# Patient Record
Sex: FEMALE | Race: WHITE | NOT HISPANIC OR LATINO | Employment: FULL TIME | URBAN - METROPOLITAN AREA
[De-identification: names, ages, dates, MRNs, and addresses within clinical notes are randomized per-mention and may not be internally consistent; named-entity substitution may affect disease eponyms.]

---

## 2017-03-08 DIAGNOSIS — F41.1 GENERALIZED ANXIETY DISORDER: ICD-10-CM

## 2017-03-08 RX ORDER — SERTRALINE HYDROCHLORIDE 100 MG/1
100 TABLET, FILM COATED ORAL DAILY
Qty: 30 TABLET | Refills: 0 | Status: SHIPPED | OUTPATIENT
Start: 2017-03-08 | End: 2017-04-07 | Stop reason: SDUPTHER

## 2017-04-07 DIAGNOSIS — F41.1 GENERALIZED ANXIETY DISORDER: ICD-10-CM

## 2017-04-10 DIAGNOSIS — F41.1 GENERALIZED ANXIETY DISORDER: ICD-10-CM

## 2017-04-10 RX ORDER — SERTRALINE HYDROCHLORIDE 100 MG/1
TABLET, FILM COATED ORAL
Qty: 90 TABLET | Refills: 0 | OUTPATIENT
Start: 2017-04-10

## 2017-04-10 RX ORDER — SERTRALINE HYDROCHLORIDE 100 MG/1
100 TABLET, FILM COATED ORAL DAILY
Qty: 15 TABLET | Refills: 0 | Status: SHIPPED | OUTPATIENT
Start: 2017-04-10 | End: 2017-05-26 | Stop reason: SDUPTHER

## 2017-05-26 ENCOUNTER — TELEPHONE (OUTPATIENT)
Dept: INTERNAL MEDICINE | Age: 22
End: 2017-05-26

## 2017-05-26 DIAGNOSIS — F41.1 GENERALIZED ANXIETY DISORDER: ICD-10-CM

## 2017-05-26 RX ORDER — SERTRALINE HYDROCHLORIDE 100 MG/1
100 TABLET, FILM COATED ORAL DAILY
Qty: 30 TABLET | Refills: 0 | Status: SHIPPED | OUTPATIENT
Start: 2017-05-26 | End: 2017-06-25 | Stop reason: SDUPTHER

## 2017-05-26 RX ORDER — SERTRALINE HYDROCHLORIDE 100 MG/1
TABLET, FILM COATED ORAL
Qty: 90 TABLET | Refills: 0 | OUTPATIENT
Start: 2017-05-26

## 2017-06-16 ENCOUNTER — OFFICE VISIT (OUTPATIENT)
Dept: INTERNAL MEDICINE | Age: 22
End: 2017-06-16

## 2017-06-16 VITALS
SYSTOLIC BLOOD PRESSURE: 118 MMHG | BODY MASS INDEX: 22.18 KG/M2 | HEART RATE: 89 BPM | HEIGHT: 66 IN | WEIGHT: 138 LBS | DIASTOLIC BLOOD PRESSURE: 80 MMHG | TEMPERATURE: 97.8 F | OXYGEN SATURATION: 99 %

## 2017-06-16 DIAGNOSIS — F40.10 SOCIAL ANXIETY DISORDER: Chronic | ICD-10-CM

## 2017-06-16 DIAGNOSIS — F41.1 GAD (GENERALIZED ANXIETY DISORDER): Primary | Chronic | ICD-10-CM

## 2017-06-16 PROCEDURE — 99213 OFFICE O/P EST LOW 20 MIN: CPT | Performed by: INTERNAL MEDICINE

## 2017-06-16 NOTE — PROGRESS NOTES
"Liz Chapman / 22 y.o. / female  06/16/2017    ASSESSMENT & PLAN:    Problem List Items Addressed This Visit     GHAZAL (generalized anxiety disorder) - Primary (Chronic)    Overview     3/4/16 started sertraline         Relevant Medications    sertraline (ZOLOFT) 100 MG tablet    Social anxiety disorder (Chronic)    Overview     3/4/16 started sertraline         Relevant Medications    sertraline (ZOLOFT) 100 MG tablet        No orders of the defined types were placed in this encounter.      Summary/Discussion:  · Watch for weight gain. Improve exercise and monitor diet.       Return in about 6 months (around 12/16/2017) for Anxiety.    No future appointments.    ____________________________________________________________________    VITALS    /80  Pulse 89  Temp 97.8 °F (36.6 °C)  Ht 66\" (167.6 cm)  Wt 138 lb (62.6 kg)  SpO2 99%  BMI 22.27 kg/m2  BP Readings from Last 3 Encounters:   06/16/17 118/80   04/15/16 132/70   03/04/16 118/70     Wt Readings from Last 3 Encounters:   06/16/17 138 lb (62.6 kg)   04/15/16 131 lb (59.4 kg)   03/04/16 136 lb (61.7 kg)      Body mass index is 22.27 kg/(m^2).    CC:  Main reason(s) for today's visit: GHAZAL      HPI:     GHAZAL, social anxiety stable on sertraline 100 mg daily. Mild weight gain with change to more sedentary work.  Denies depression. Reapplying for PT program.     Patient Care Team:  Carlos Meeks MD as PCP - General (Internal Medicine)  ____________________________________________________________________    REVIEW OF SYSTEMS    Review of Systems  As noted per HPI  Constitutional neg x wt gain  Gi neg  Other: As noted per HPI      PHYSICAL EXAMINATION    Physical Exam  Constitutional  No distress   Psychiatric  Alert. Judgment and thought content normal. Mood normal      REVIEWED DATA:    Labs:   Lab Results   Component Value Date     06/05/2015    K 4.5 06/05/2015    AST 13 06/05/2015    ALT 7 06/05/2015    BUN 15 06/05/2015    CREATININE 0.85 " 06/05/2015    EGFRIFNONA >60 06/05/2015    EGFRIFAFRI >60 06/05/2015       Lab Results   Component Value Date    GLU 83 06/05/2015       No results found for: LDL, HDL, TRIG, CHOLHDLRATIO    Lab Results   Component Value Date    TSH 1.53 06/05/2015          Lab Results   Component Value Date    WBC 8.34 06/05/2015    HGB 15.6 (H) 06/05/2015     06/05/2015        Imaging:        Medical Tests:        Summary of old records / correspondence / consultant report:        Request outside records:        Allergies   Allergen Reactions   • No Known Drug Allergy         Current Outpatient Prescriptions   Medication Sig Dispense Refill   • albuterol (PROVENTIL HFA;VENTOLIN HFA) 108 (90 BASE) MCG/ACT inhaler Inhale 2 puffs every 4 (four) hours as needed.     • norethindrone-ethinyl estradiol-iron (MICROGESTIN FE1.5/30) 1.5-30 MG-MCG tablet Take  by mouth daily.     • sertraline (ZOLOFT) 100 MG tablet Take 1 tablet by mouth Daily. 30 tablet 0     No current facility-administered medications for this visit.        PFSH:     The following portions of the patient's history were reviewed and updated as appropriate: Allergies / Current Medications / Past Medical History / Surgical History / Social History / Family History    Patient Active Problem List   Diagnosis   • GHAZAL (generalized anxiety disorder)   • Social anxiety disorder   • Mild intermittent asthma       Past Medical History:   Diagnosis Date   • Anxiety    • Tonsil stone        History reviewed. No pertinent surgical history.    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[employer]) [employer]     Wants to apply to PT program   • Part time work with [employer]      Social History Main Topics   • Smoking status: Never Smoker   • Smokeless tobacco: Never Used   • Alcohol use 2.4 oz/week     4 Standard drinks or equivalent per week   • Drug use:  No      Comment: previous recreation marijuana use, not currently   • Sexual activity: Not Asked     Other Topics Concern   • None     Social History Narrative       Family History   Problem Relation Age of Onset   • Alcohol abuse Father    • Alcohol abuse Sister          **Brendan Disclaimer:   Much of this encounter note is an electronic transcription/translation of spoken language to printed text. The electronic translation of spoken language may permit erroneous, or at times, nonsensical words or phrases to be inadvertently transcribed. Although I have reviewed the note for such errors, some may still exist.

## 2017-06-25 DIAGNOSIS — F41.1 GENERALIZED ANXIETY DISORDER: ICD-10-CM

## 2017-06-26 RX ORDER — SERTRALINE HYDROCHLORIDE 100 MG/1
TABLET, FILM COATED ORAL
Qty: 30 TABLET | Refills: 5 | Status: SHIPPED | OUTPATIENT
Start: 2017-06-26 | End: 2018-03-13 | Stop reason: SDUPTHER

## 2018-03-13 ENCOUNTER — OFFICE VISIT (OUTPATIENT)
Dept: INTERNAL MEDICINE | Age: 23
End: 2018-03-13

## 2018-03-13 VITALS
HEART RATE: 60 BPM | WEIGHT: 142 LBS | SYSTOLIC BLOOD PRESSURE: 114 MMHG | OXYGEN SATURATION: 97 % | DIASTOLIC BLOOD PRESSURE: 78 MMHG | HEIGHT: 66 IN | BODY MASS INDEX: 22.82 KG/M2 | TEMPERATURE: 98.2 F

## 2018-03-13 DIAGNOSIS — J45.20 MILD INTERMITTENT ASTHMA WITHOUT COMPLICATION: Chronic | ICD-10-CM

## 2018-03-13 DIAGNOSIS — F40.10 SOCIAL ANXIETY DISORDER: Chronic | ICD-10-CM

## 2018-03-13 DIAGNOSIS — F41.1 GENERALIZED ANXIETY DISORDER: ICD-10-CM

## 2018-03-13 DIAGNOSIS — F41.8 PERFORMANCE ANXIETY: Chronic | ICD-10-CM

## 2018-03-13 DIAGNOSIS — F41.8 PERFORMANCE ANXIETY: Primary | Chronic | ICD-10-CM

## 2018-03-13 PROCEDURE — 99214 OFFICE O/P EST MOD 30 MIN: CPT | Performed by: INTERNAL MEDICINE

## 2018-03-13 RX ORDER — PROPRANOLOL HYDROCHLORIDE 10 MG/1
10 TABLET ORAL 2 TIMES DAILY PRN
Qty: 40 TABLET | Refills: 1 | Status: SHIPPED | OUTPATIENT
Start: 2018-03-13 | End: 2018-03-13 | Stop reason: SDUPTHER

## 2018-03-13 RX ORDER — ALBUTEROL SULFATE 90 UG/1
2 AEROSOL, METERED RESPIRATORY (INHALATION) EVERY 4 HOURS PRN
Qty: 1 INHALER | Refills: 5 | Status: SHIPPED | OUTPATIENT
Start: 2018-03-13 | End: 2020-10-15 | Stop reason: SDUPTHER

## 2018-03-13 RX ORDER — SERTRALINE HYDROCHLORIDE 100 MG/1
200 TABLET, FILM COATED ORAL DAILY
Qty: 60 TABLET | Refills: 3 | Status: SHIPPED | OUTPATIENT
Start: 2018-03-13 | End: 2019-10-10 | Stop reason: CLARIF

## 2018-03-13 NOTE — PROGRESS NOTES
"[facility]  MEGHNA NUR M.D.      Liz Chapman / 22 y.o. / female  03/13/2018      MEDICATIONS  Current Outpatient Prescriptions   Medication Sig Dispense Refill   • norethindrone-ethinyl estradiol-iron (MICROGESTIN FE1.5/30) 1.5-30 MG-MCG tablet Take  by mouth daily.     • sertraline (ZOLOFT) 100 MG tablet Take 1 by mouth Daily. 60 tablet 3   • albuterol (PROVENTIL HFA;VENTOLIN HFA) 108 (90 Base) MCG/ACT inhaler Inhale 2 puffs Every 4 (Four) Hours As Needed for Wheezing or Shortness of Air. 1 inhaler 5       VITALS    Visit Vitals  /78   Pulse 60   Temp 98.2 °F (36.8 °C)   Ht 167.6 cm (65.98\")   Wt 64.4 kg (142 lb)   SpO2 97%   BMI 22.93 kg/m²       BP Readings from Last 3 Encounters:   03/13/18 114/78   06/16/17 118/80   04/15/16 132/70     Wt Readings from Last 3 Encounters:   03/13/18 64.4 kg (142 lb)   06/16/17 62.6 kg (138 lb)   04/15/16 59.4 kg (131 lb)      Body mass index is 22.93 kg/m².    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 Has significant anxiety night before and during presentation. It affects her quality of life tremendously. On sertraline 100 mg for GHAZAL and social anxiety. Denies excessive caffeine. Denies depression. Inquires about beta blockers. She does have mild intermittent asthma. Needs a new Rx for different inhaler.     Patient Care Team:  Carlos Nur MD as PCP - General (Internal Medicine)  Ai Quiroz MD as Consulting Physician (Obstetrics and Gynecology)  ____________________________________________________________________    ASSESSMENT & PLAN:    Problem List Items Addressed This Visit        High    Performance anxiety - Primary (Chronic)    Current Assessment & Plan     Relating to school. Trial of propranolol 10 mg BID prn. Advised to monitor hr, blood pressure, and asthma. Strongly recommended CBT.          Relevant Medications    propranolol (INDERAL) 10 MG " tablet       Medium    Social anxiety disorder (Chronic)    Overview     Same as per GHAZAL.          Relevant Medications    sertraline (ZOLOFT) 100 MG tablet    Mild intermittent asthma (Chronic)    Relevant Medications    albuterol (PROVENTIL HFA;VENTOLIN HFA) 108 (90 Base) MCG/ACT inhaler      Other Visit Diagnoses     Generalized anxiety disorder        Relevant Medications    sertraline (ZOLOFT) 100 MG tablet        No orders of the defined types were placed in this encounter.      Summary/Discussion:  ·     Return in about 3 months (around 6/13/2018) for Anxiety.    Future Appointments  Date Time Provider Department Center   6/11/2018 11:00 AM Carlos Meeks MD [facility] None       ____________________________________________________________________    REVIEW OF SYSTEMS    Review of Systems  Anxiety attacks, no depression  Heart racing sensations, no chest pain  GI neg  Neuro neg    PHYSICAL EXAMINATION    Physical Exam   Constitutional: No distress.   Psychiatric: Her speech is normal and behavior is normal. Judgment and thought content normal. Her mood appears anxious. Cognition and memory are normal. She does not exhibit a depressed mood. She is attentive.         REVIEWED DATA:    Labs:     Lab Results   Component Value Date     06/05/2015    K 4.5 06/05/2015    AST 13 06/05/2015    ALT 7 06/05/2015    BUN 15 06/05/2015    CREATININE 0.85 06/05/2015    EGFRIFNONA >60 06/05/2015    EGFRIFAFRI >60 06/05/2015       No results found for: HGBA1C, GLUCOSE, MICROALBUR    No results found for: LDL, HDL, TRIG, CHOLHDLRATIO    Lab Results   Component Value Date    TSH 1.53 06/05/2015       Lab Results   Component Value Date    WBC 8.34 06/05/2015    HGB 15.6 (H) 06/05/2015     06/05/2015        Imaging:         Medical Tests:         Summary of old records / correspondence / consultant report:         Request outside records:         ALLERGIES  Allergies   Allergen Reactions   • No Known Drug Allergy          PFSH:     The following portions of the patient's history were reviewed and updated as appropriate: Allergies / Current Medications / Past Medical History / Surgical History / Social History / Family History    PROBLEM LIST   Patient Active Problem List   Diagnosis   • GHAZAL (generalized anxiety disorder)   • Social anxiety disorder   • Mild intermittent asthma   • Performance anxiety       PAST MEDICAL HISTORY  Past Medical History:   Diagnosis Date   • Anxiety    • Tonsil stone        SURGICAL HISTORY  History reviewed. No pertinent surgical history.    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[employer]) [employer]     Wants to apply to PT program   • Part time work with [employer]      Social History Main Topics   • Smoking status: Never Smoker   • Smokeless tobacco: Never Used   • Alcohol use 2.4 oz/week     4 Standard drinks or equivalent per week   • Drug use: No      Comment: previous recreation marijuana use, not currently     Other Topics Concern   • Not on file       FAMILY HISTORY  Family History   Problem Relation Age of Onset   • Alcohol abuse Father    • Alcohol abuse Sister            **Brendan Disclaimer:   Much of this encounter note is an electronic transcription/translation of spoken language to printed text. The electronic translation of spoken language may permit erroneous, or at times, nonsensical words or phrases to be inadvertently transcribed. Although I have reviewed the note for such errors, some may still exist.

## 2018-03-13 NOTE — ASSESSMENT & PLAN NOTE
Relating to school. Trial of propranolol 10 mg BID prn. Advised to monitor hr, blood pressure, and asthma. Strongly recommended CBT.

## 2018-03-14 RX ORDER — PROPRANOLOL HYDROCHLORIDE 10 MG/1
TABLET ORAL
Qty: 180 TABLET | Refills: 1 | Status: SHIPPED | OUTPATIENT
Start: 2018-03-14 | End: 2022-04-20

## 2018-11-07 DIAGNOSIS — F41.1 GENERALIZED ANXIETY DISORDER: ICD-10-CM

## 2018-11-08 RX ORDER — SERTRALINE HYDROCHLORIDE 100 MG/1
200 TABLET, FILM COATED ORAL DAILY
Qty: 60 TABLET | Refills: 0 | OUTPATIENT
Start: 2018-11-08

## 2018-11-16 DIAGNOSIS — F41.1 GENERALIZED ANXIETY DISORDER: ICD-10-CM

## 2018-11-19 RX ORDER — SERTRALINE HYDROCHLORIDE 100 MG/1
100 TABLET, FILM COATED ORAL DAILY
Qty: 30 TABLET | Refills: 5 | Status: SHIPPED | OUTPATIENT
Start: 2018-11-19 | End: 2019-10-10 | Stop reason: CLARIF

## 2019-07-14 DIAGNOSIS — F41.1 GENERALIZED ANXIETY DISORDER: ICD-10-CM

## 2019-07-15 RX ORDER — SERTRALINE HYDROCHLORIDE 100 MG/1
100 TABLET, FILM COATED ORAL DAILY
Qty: 30 TABLET | Refills: 0 | Status: SHIPPED | OUTPATIENT
Start: 2019-07-15 | End: 2019-10-10 | Stop reason: CLARIF

## 2019-08-09 RX ORDER — SERTRALINE HYDROCHLORIDE 100 MG/1
TABLET, FILM COATED ORAL
Qty: 90 TABLET | Refills: 0 | OUTPATIENT
Start: 2019-08-09

## 2019-10-01 RX ORDER — SERTRALINE HYDROCHLORIDE 100 MG/1
TABLET, FILM COATED ORAL
Qty: 30 TABLET | Refills: 0 | OUTPATIENT
Start: 2019-10-01

## 2019-10-10 ENCOUNTER — TELEPHONE (OUTPATIENT)
Dept: INTERNAL MEDICINE | Age: 24
End: 2019-10-10

## 2019-10-10 DIAGNOSIS — F41.1 GENERALIZED ANXIETY DISORDER: ICD-10-CM

## 2019-10-10 RX ORDER — SERTRALINE HYDROCHLORIDE 100 MG/1
100 TABLET, FILM COATED ORAL DAILY
Qty: 30 TABLET | Refills: 0 | Status: SHIPPED | OUTPATIENT
Start: 2019-10-10 | End: 2019-11-04 | Stop reason: SDUPTHER

## 2019-10-10 RX ORDER — SERTRALINE HYDROCHLORIDE 100 MG/1
100 TABLET, FILM COATED ORAL DAILY
Qty: 30 TABLET | Refills: 0 | Status: SHIPPED | OUTPATIENT
Start: 2019-10-10 | End: 2019-10-10 | Stop reason: SDUPTHER

## 2019-10-10 NOTE — TELEPHONE ENCOUNTER
Patient called back and stated her Zoloft went to the wrong pharmacy. She needs it sent to [facility] in prev message.

## 2019-10-10 NOTE — TELEPHONE ENCOUNTER
Patients Rx was resent. All pharmacies in pt chart were incorrect.   Patient must be seen for further refills. Has not been seen in office since 03.13.18

## 2019-10-10 NOTE — TELEPHONE ENCOUNTER
Pt is completely out of her Zoloft. She has changed pharmacies and now uses [facility] and [facility].

## 2019-11-02 DIAGNOSIS — F41.1 GENERALIZED ANXIETY DISORDER: ICD-10-CM

## 2019-11-04 DIAGNOSIS — F41.1 GENERALIZED ANXIETY DISORDER: ICD-10-CM

## 2019-11-04 RX ORDER — SERTRALINE HYDROCHLORIDE 100 MG/1
TABLET, FILM COATED ORAL
Qty: 30 TABLET | Refills: 0 | OUTPATIENT
Start: 2019-11-04

## 2019-11-04 NOTE — TELEPHONE ENCOUNTER
She should make a medication refill visit within 30 days with me. Once she makes that appointment, okay to refill for 30 days supply only.

## 2019-11-06 NOTE — TELEPHONE ENCOUNTER
Call patient. She is way overdue on follow-up. I believe we gave her a month refill until she was seen within 1 month. Not sure why her follow-up was not made earlier than April.     Have her see me for a 5 minute visit for medication refill this/next week. Give her #14 pills until she can be seen.

## 2019-11-12 RX ORDER — SERTRALINE HYDROCHLORIDE 100 MG/1
100 TABLET, FILM COATED ORAL DAILY
Qty: 30 TABLET | Refills: 0 | Status: SHIPPED | OUTPATIENT
Start: 2019-11-12 | End: 2019-12-11 | Stop reason: SDUPTHER

## 2019-12-02 ENCOUNTER — OFFICE VISIT (OUTPATIENT)
Dept: INTERNAL MEDICINE | Age: 24
End: 2019-12-02

## 2019-12-02 VITALS
HEIGHT: 67 IN | BODY MASS INDEX: 23.39 KG/M2 | TEMPERATURE: 95.6 F | HEART RATE: 69 BPM | OXYGEN SATURATION: 98 % | SYSTOLIC BLOOD PRESSURE: 122 MMHG | DIASTOLIC BLOOD PRESSURE: 84 MMHG | WEIGHT: 149 LBS

## 2019-12-02 DIAGNOSIS — R63.5 WEIGHT GAIN: ICD-10-CM

## 2019-12-02 DIAGNOSIS — F41.1 GAD (GENERALIZED ANXIETY DISORDER): Primary | Chronic | ICD-10-CM

## 2019-12-02 DIAGNOSIS — F40.10 SOCIAL ANXIETY DISORDER: Chronic | ICD-10-CM

## 2019-12-02 PROCEDURE — 99213 OFFICE O/P EST LOW 20 MIN: CPT | Performed by: INTERNAL MEDICINE

## 2019-12-02 NOTE — PROGRESS NOTES
[facility] [place] [facility]  MEGHNA NUR M.D.      Liz Chapman / 24 y.o. / female  12/02/2019      CHIEF COMPLAINT     Anxiety (med refill)      ASSESSMENT & PLAN     Problem List Items Addressed This Visit        High    GHAZAL (generalized anxiety disorder) - Primary (Chronic)    Overview     3/4/16 started sertraline         Current Assessment & Plan     Monitor for further weight gain.   Continue sertraline 100 mg qd and propranolol PRN for performance anxiety.          Relevant Medications    sertraline (ZOLOFT) 100 MG tablet       Medium    Social anxiety disorder (Chronic)    Overview     Same as per GHAZAL.          Relevant Medications    sertraline (ZOLOFT) 100 MG tablet    Weight gain    Current Assessment & Plan     Maintain a low sugar/starch/carbohydrate diet and exercise regularly. Wt loss advised.      BMI Readings from Last 3 Encounters:   12/02/19 23.34 kg/m²   03/13/18 22.93 kg/m²   06/16/17 22.27 kg/m²      Wt Readings from Last 3 Encounters:   12/02/19 67.6 kg (149 lb)   03/13/18 64.4 kg (142 lb)   06/16/17 62.6 kg (138 lb)                 No orders of the defined types were placed in this encounter.    No orders of the defined types were placed in this encounter.      Summary/Discussion:  ·       Next Appointment with me: 4/2/2020    Return for Next scheduled follow up.      MEDICATIONS     Current Outpatient Medications   Medication Sig Dispense Refill   • albuterol (PROVENTIL HFA;VENTOLIN HFA) 108 (90 Base) MCG/ACT inhaler Inhale 2 puffs Every 4 (Four) Hours As Needed for Wheezing or Shortness of Air. 1 inhaler 5   • norethindrone-ethinyl estradiol-iron (MICROGESTIN FE1.5/30) 1.5-30 MG-MCG tablet Take  by mouth daily.     • propranolol (INDERAL) 10 MG tablet TAKE 1 TO 2 TABLETS BY MOUTH DAILY AS NEEDED 180 tablet 1   • sertraline (ZOLOFT) 100 MG tablet Take 1 tablet by mouth Daily. 30 tablet 0     No current facility-administered medications for this visit.           VITAL SIGNS     Visit Vitals  BP  "122/84   Pulse 69   Temp 95.6 °F (35.3 °C)   Ht 170.2 cm (67\")   Wt 67.6 kg (149 lb)   LMP 11/23/2019 (Approximate)   SpO2 98%   BMI 23.34 kg/m²       BP Readings from Last 3 Encounters:   12/02/19 122/84   03/13/18 114/78   06/16/17 118/80     Wt Readings from Last 3 Encounters:   12/02/19 67.6 kg (149 lb)   03/13/18 64.4 kg (142 lb)   06/16/17 62.6 kg (138 lb)      Body mass index is 23.34 kg/m².      HISTORY OF PRESENT ILLNESS     Graduated from PT assistance program and is now working full time.   GHAZAL/SAD stable on sertraline but noted > 20 lbs weight gain since 2016.       Patient Care Team:  Carlos Meeks MD as PCP - General (Internal Medicine)  Ai Quiroz MD as Consulting Physician (Obstetrics and Gynecology)      REVIEW OF SYSTEMS     Review of Systems  Constitutional neg x weight gain   Resp no active asthma   Psych stable anxiety, neg depression  Neuro neg       PHYSICAL EXAMINATION     Physical Exam  Constitutional  No distress, weight gain   Psychiatric  Alert. Judgment intact. Thought content normal. Mood stable anxiety       REVIEWED DATA     Labs:     Lab Results   Component Value Date     06/05/2015    K 4.5 06/05/2015    CALCIUM 9.5 06/05/2015    AST 13 06/05/2015    ALT 7 06/05/2015    BUN 15 06/05/2015    CREATININE 0.85 06/05/2015    EGFRIFNONA >60 06/05/2015    EGFRIFAFRI >60 06/05/2015       Lab Results   Component Value Date    GLU 83 06/05/2015       No results found for: LDL, HDL, TRIG, CHOLHDLRATIO    Lab Results   Component Value Date    TSH 1.53 06/05/2015       Lab Results   Component Value Date    WBC 8.34 06/05/2015    HGB 15.6 (H) 06/05/2015     06/05/2015       No results found for: PROTEIN, GLUCOSEU, BLOODU, NITRITEU, LEUKOCYTESUR    Imaging:         Medical Tests:         Summary of old records / correspondence / consultant report:         Request outside records:         ALLERGIES  Allergies   Allergen Reactions   • No Known Drug Allergy         PFSH:     The " following portions of the patient's history were reviewed and updated as appropriate: Allergies / Current Medications / Past Medical History / Surgical History / Social History / Family History    PROBLEM LIST   Patient Active Problem List   Diagnosis   • GHAZAL (generalized anxiety disorder)   • Social anxiety disorder   • Mild intermittent asthma   • Performance anxiety   • Weight gain       PAST MEDICAL HISTORY  Past Medical History:   Diagnosis Date   • Anxiety    • Tonsil stone        SURGICAL HISTORY  No past surgical history on file.    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[employer])     Employer: [employer]     Comment: Wants to apply to PT program   • Occupation: Part time work with [employer]   Tobacco Use   • Smoking status: Never Smoker   • Smokeless tobacco: Never Used   Substance and Sexual Activity   • Alcohol use: Yes     Alcohol/week: 2.4 oz     Types: 4 Standard drinks or equivalent per week   • Drug use: No     Comment: previous recreation marijuana use, not currently       FAMILY HISTORY  Family History   Problem Relation Age of Onset   • Alcohol abuse Father    • Alcohol abuse Sister          **Dragon Disclaimer:   Much of this encounter note is an electronic transcription/translation of spoken language to printed text. The electronic translation of spoken language may permit erroneous, or at times, nonsensical words or phrases to be inadvertently transcribed. Although I have reviewed the note for such errors, some may still exist.       Template created by Brandon Meeks MD

## 2019-12-02 NOTE — ASSESSMENT & PLAN NOTE
Maintain a low sugar/starch/carbohydrate diet and exercise regularly. Wt loss advised.      BMI Readings from Last 3 Encounters:   12/02/19 23.34 kg/m²   03/13/18 22.93 kg/m²   06/16/17 22.27 kg/m²      Wt Readings from Last 3 Encounters:   12/02/19 67.6 kg (149 lb)   03/13/18 64.4 kg (142 lb)   06/16/17 62.6 kg (138 lb)

## 2019-12-02 NOTE — ASSESSMENT & PLAN NOTE
Monitor for further weight gain.   Continue sertraline 100 mg qd and propranolol PRN for performance anxiety.

## 2019-12-11 DIAGNOSIS — F41.1 GENERALIZED ANXIETY DISORDER: ICD-10-CM

## 2019-12-11 RX ORDER — SERTRALINE HYDROCHLORIDE 100 MG/1
TABLET, FILM COATED ORAL
Qty: 30 TABLET | Refills: 5 | Status: SHIPPED | OUTPATIENT
Start: 2019-12-11 | End: 2020-07-16

## 2020-03-19 DIAGNOSIS — Z00.00 PREVENTATIVE HEALTH CARE: Primary | ICD-10-CM

## 2020-03-23 ENCOUNTER — RESULTS ENCOUNTER (OUTPATIENT)
Dept: INTERNAL MEDICINE | Age: 25
End: 2020-03-23

## 2020-03-23 DIAGNOSIS — Z00.00 PREVENTATIVE HEALTH CARE: ICD-10-CM

## 2020-03-26 LAB
ALBUMIN SERPL-MCNC: 4 G/DL (ref 3.5–5.2)
ALBUMIN/GLOB SERPL: 1.3 G/DL
ALP SERPL-CCNC: 38 U/L (ref 39–117)
ALT SERPL-CCNC: 11 U/L (ref 1–33)
APPEARANCE UR: CLEAR
AST SERPL-CCNC: 13 U/L (ref 1–32)
BACTERIA #/AREA URNS HPF: ABNORMAL /HPF
BASOPHILS # BLD AUTO: 0.06 10*3/MM3 (ref 0–0.2)
BASOPHILS NFR BLD AUTO: 0.9 % (ref 0–1.5)
BILIRUB SERPL-MCNC: 0.4 MG/DL (ref 0.2–1.2)
BILIRUB UR QL STRIP: NEGATIVE
BUN SERPL-MCNC: 11 MG/DL (ref 6–20)
BUN/CREAT SERPL: 14.7 (ref 7–25)
CALCIUM SERPL-MCNC: 9.4 MG/DL (ref 8.6–10.5)
CASTS URNS MICRO: ABNORMAL
CHLORIDE SERPL-SCNC: 102 MMOL/L (ref 98–107)
CHOLEST SERPL-MCNC: 168 MG/DL (ref 0–200)
CHOLEST/HDLC SERPL: 2.63 {RATIO}
CO2 SERPL-SCNC: 24.6 MMOL/L (ref 22–29)
COLOR UR: YELLOW
CREAT SERPL-MCNC: 0.75 MG/DL (ref 0.57–1)
EOSINOPHIL # BLD AUTO: 0.38 10*3/MM3 (ref 0–0.4)
EOSINOPHIL NFR BLD AUTO: 5.4 % (ref 0.3–6.2)
EPI CELLS #/AREA URNS HPF: ABNORMAL /HPF
ERYTHROCYTE [DISTWIDTH] IN BLOOD BY AUTOMATED COUNT: 11.5 % (ref 12.3–15.4)
GLOBULIN SER CALC-MCNC: 3 GM/DL
GLUCOSE SERPL-MCNC: 99 MG/DL (ref 65–99)
GLUCOSE UR QL: NEGATIVE
HBA1C MFR BLD: 4.7 % (ref 4.8–5.6)
HCT VFR BLD AUTO: 41.2 % (ref 34–46.6)
HDLC SERPL-MCNC: 64 MG/DL (ref 40–60)
HGB BLD-MCNC: 13.9 G/DL (ref 12–15.9)
HGB UR QL STRIP: ABNORMAL
IMM GRANULOCYTES # BLD AUTO: 0.03 10*3/MM3 (ref 0–0.05)
IMM GRANULOCYTES NFR BLD AUTO: 0.4 % (ref 0–0.5)
KETONES UR QL STRIP: NEGATIVE
LDLC SERPL CALC-MCNC: 84 MG/DL (ref 0–100)
LEUKOCYTE ESTERASE UR QL STRIP: ABNORMAL
LYMPHOCYTES # BLD AUTO: 2.16 10*3/MM3 (ref 0.7–3.1)
LYMPHOCYTES NFR BLD AUTO: 30.7 % (ref 19.6–45.3)
MCH RBC QN AUTO: 32.5 PG (ref 26.6–33)
MCHC RBC AUTO-ENTMCNC: 33.7 G/DL (ref 31.5–35.7)
MCV RBC AUTO: 96.3 FL (ref 79–97)
MONOCYTES # BLD AUTO: 0.6 10*3/MM3 (ref 0.1–0.9)
MONOCYTES NFR BLD AUTO: 8.5 % (ref 5–12)
NEUTROPHILS # BLD AUTO: 3.8 10*3/MM3 (ref 1.7–7)
NEUTROPHILS NFR BLD AUTO: 54.1 % (ref 42.7–76)
NITRITE UR QL STRIP: NEGATIVE
NRBC BLD AUTO-RTO: 0 /100 WBC (ref 0–0.2)
PH UR STRIP: 6.5 [PH] (ref 5–8)
PLATELET # BLD AUTO: 309 10*3/MM3 (ref 140–450)
POTASSIUM SERPL-SCNC: 4.2 MMOL/L (ref 3.5–5.2)
PROT SERPL-MCNC: 7 G/DL (ref 6–8.5)
PROT UR QL STRIP: NEGATIVE
RBC # BLD AUTO: 4.28 10*6/MM3 (ref 3.77–5.28)
RBC #/AREA URNS HPF: ABNORMAL /HPF
SODIUM SERPL-SCNC: 138 MMOL/L (ref 136–145)
SP GR UR: 1.01 (ref 1–1.03)
T4 FREE SERPL-MCNC: 1.11 NG/DL (ref 0.93–1.7)
TRIGL SERPL-MCNC: 99 MG/DL (ref 0–150)
TSH SERPL DL<=0.005 MIU/L-ACNC: 1.74 UIU/ML (ref 0.27–4.2)
UROBILINOGEN UR STRIP-MCNC: ABNORMAL MG/DL
VLDLC SERPL CALC-MCNC: 19.8 MG/DL
WBC # BLD AUTO: 7.03 10*3/MM3 (ref 3.4–10.8)
WBC #/AREA URNS HPF: ABNORMAL /HPF

## 2020-07-16 DIAGNOSIS — F41.1 GENERALIZED ANXIETY DISORDER: ICD-10-CM

## 2020-07-16 RX ORDER — SERTRALINE HYDROCHLORIDE 100 MG/1
TABLET, FILM COATED ORAL
Qty: 90 TABLET | Refills: 0 | Status: SHIPPED | OUTPATIENT
Start: 2020-07-16 | End: 2021-02-09 | Stop reason: SDUPTHER

## 2020-08-17 ENCOUNTER — TELEPHONE (OUTPATIENT)
Dept: INTERNAL MEDICINE | Age: 25
End: 2020-08-17

## 2020-08-17 NOTE — TELEPHONE ENCOUNTER
Per dr dennis to call pt and cancel pauline   Need to go [facility] to be evaluated     Called pt and she was having fever of 100.8 f  Low back pain on one side   Cloudy and odor urine   Think she has kidney problems.

## 2020-09-01 ENCOUNTER — OFFICE VISIT (OUTPATIENT)
Dept: INTERNAL MEDICINE | Age: 25
End: 2020-09-01

## 2020-09-01 VITALS
HEIGHT: 67 IN | OXYGEN SATURATION: 97 % | BODY MASS INDEX: 22.76 KG/M2 | HEART RATE: 91 BPM | SYSTOLIC BLOOD PRESSURE: 120 MMHG | TEMPERATURE: 95.6 F | DIASTOLIC BLOOD PRESSURE: 82 MMHG | WEIGHT: 145 LBS

## 2020-09-01 DIAGNOSIS — Z00.00 ENCOUNTER FOR ANNUAL HEALTH EXAMINATION: Primary | ICD-10-CM

## 2020-09-01 DIAGNOSIS — N39.0 RECURRENT UTI: ICD-10-CM

## 2020-09-01 DIAGNOSIS — F41.1 GAD (GENERALIZED ANXIETY DISORDER): Chronic | ICD-10-CM

## 2020-09-01 DIAGNOSIS — F40.10 SOCIAL ANXIETY DISORDER: Chronic | ICD-10-CM

## 2020-09-01 PROCEDURE — 99213 OFFICE O/P EST LOW 20 MIN: CPT | Performed by: INTERNAL MEDICINE

## 2020-09-01 PROCEDURE — 99395 PREV VISIT EST AGE 18-39: CPT | Performed by: INTERNAL MEDICINE

## 2020-09-01 NOTE — PROGRESS NOTES
"[facility] [place] INTERNAL MEDICINE  MEGHNA NUR M.D.      Liz Chapman / 25 y.o. / female  09/01/2020      VITALS     Visit Vitals  /82   Pulse 91   Temp 95.6 °F (35.3 °C)   Ht 170.2 cm (67\")   Wt 65.8 kg (145 lb)   LMP 08/18/2020 (Exact Date)   SpO2 97%   BMI 22.71 kg/m²       BP Readings from Last 3 Encounters:   09/01/20 120/82   12/02/19 122/84   03/13/18 114/78     Wt Readings from Last 3 Encounters:   09/01/20 65.8 kg (145 lb)   12/02/19 67.6 kg (149 lb)   03/13/18 64.4 kg (142 lb)      Body mass index is 22.71 kg/m².    MEDICATIONS     Current Outpatient Medications   Medication Sig Dispense Refill   • albuterol (PROVENTIL HFA;VENTOLIN HFA) 108 (90 Base) MCG/ACT inhaler Inhale 2 puffs Every 4 (Four) Hours As Needed for Wheezing or Shortness of Air. 1 inhaler 5   • norethindrone-ethinyl estradiol-iron (MICROGESTIN FE1.5/30) 1.5-30 MG-MCG tablet Take  by mouth daily.     • propranolol (INDERAL) 10 MG tablet TAKE 1 TO 2 TABLETS BY MOUTH DAILY AS NEEDED 180 tablet 1   • sertraline (ZOLOFT) 100 MG tablet TAKE 1 TABLET BY MOUTH EVERY DAY 90 tablet 0     No current facility-administered medications for this visit.        _____________________________________________________________________________________    CHIEF COMPLAINT     Annual Exam and Anxiety      HISTORY OF PRESENT ILLNESS     Liz presents for annual health maintenance visit.    > 8 months since last visit for anxiety disorder.   She decided to stay on sertraline 100 mg for GHAZAL and SAD.  Denies significant side effects.   Had increased stress at work due to COVID-19.   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   Takes propranolol PRN for performance anxiety but has not needed it for awhile.     Was in ED last month for for ascending UTI and treated with Levaquin.   History of recurrent UTI, followed by OB/gyne.     · Last health maintenance visit: unsure  · General health: some medical problems  · Lifestyle:  · Attempting to lose weight?: No   · Diet: eats moderately " healthy  · Exercise: exercises 2-3 days/week  · Tobacco: Never used   · Alcohol: occasional/rare  · Work: Full-time  · Reproductive health:  · Sexually active?: Yes   · Sexual problems?: No problems x recurrent UTI associated with sexual activity   · Concern for STD?: No    · Sees Gynecologist?: Yes   · Yady/Postmenopausal?: No   · Domestic abuse concerns: No   · Depression Screening:      PHQ-2/PHQ-9 Depression Screening 9/1/2020   Little interest or pleasure in doing things 0   Feeling down, depressed, or hopeless 0   Total Score 0         PHQ-2: 0 (Not depressed)   PHQ-9: 0 (Negative screening for depression)    Patient Care Team:  Carlos Meeks MD as PCP - General (Internal Medicine)  Ai Quiroz MD as Consulting Physician (Obstetrics and Gynecology)  ______________________________________________________________________    ALLERGIES  Allergies   Allergen Reactions   • No Known Drug Allergy         PFSH:     The following portions of the patient's history were reviewed and updated as appropriate: Allergies / Current Medications / Past Medical History / Surgical History / Social History / Family History    PROBLEM LIST   Patient Active Problem List   Diagnosis   • GHAZAL (generalized anxiety disorder)   • Social anxiety disorder   • Mild intermittent asthma   • Performance anxiety   • Recurrent UTI       PAST MEDICAL HISTORY  Past Medical History:   Diagnosis Date   • Anxiety    • Recurrent UTI    • Tonsil stone        SURGICAL HISTORY  History reviewed. No pertinent surgical history.    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[employer])   Tobacco Use   • Smoking status: Never Smoker   • Smokeless tobacco: Never Used   Substance and Sexual Activity   • Alcohol  use: Yes     Alcohol/week: 4.0 standard drinks     Types: 4 Standard drinks or equivalent per week   • Drug use: No     Comment: previous recreation marijuana use, not currently   • Sexual activity: Yes     Partners: Male     Birth control/protection: OCP   Lifestyle   • Physical activity:     Days per week: 0 days     Minutes per session: Not on file   • Stress: Only a little       FAMILY HISTORY  Family History   Problem Relation Age of Onset   • Alcohol abuse Father    • Osteoarthritis Father    • Alcohol abuse Sister    • No Known Problems Mother    • No Known Problems Maternal Grandmother    • No Known Problems Sister    • Depression Neg Hx    • Bipolar disorder Neg Hx    • Cancer Neg Hx    • Diabetes Neg Hx        IMMUNIZATION HISTORY  Immunization History   Administered Date(s) Administered   • Flu Vaccine Quad PF >18YRS 11/02/2019   • Hepatitis B 01/04/2018, 02/15/2018   • Hepatitis B Vaccine Adult IM 02/15/2018   • PPD Test 03/01/2019       ______________________________________________________________________    REVIEW OF SYSTEMS     Review of Systems   Constitutional: Negative for fever and unexpected weight change.   Gastrointestinal: Positive for diarrhea (  loose stool since taking levaquin). Negative for abdominal pain (recurrent uti ).   Genitourinary: Negative for flank pain, hematuria, pelvic pain and vaginal discharge.   Psychiatric/Behavioral: Nervous/anxious: stable anxiety.          PHYSICAL EXAMINATION     Physical Exam   Constitutional: She is oriented to person, place, and time. She appears well-developed and well-nourished. No distress.   HENT:   Head: Normocephalic and atraumatic.   Right Ear: External ear normal.   Left Ear: External ear normal.   Eyes: Pupils are equal, round, and reactive to light. Conjunctivae and EOM are normal. No scleral icterus.   Neck: Normal range of motion. Neck supple. No tracheal deviation present. No thyroid mass and no thyromegaly present.   Cardiovascular:  Normal rate, regular rhythm, normal heart sounds and intact distal pulses.   Pulmonary/Chest: Effort normal and breath sounds normal.   Abdominal: Soft. Bowel sounds are normal. She exhibits no distension and no mass. There is no tenderness. No hernia.   Genitourinary:   Genitourinary Comments: Deferred to gyne/by patient unless specified otherwise.    Musculoskeletal: She exhibits no edema or deformity.   Neurological: She is alert and oriented to person, place, and time. She has normal reflexes. No cranial nerve deficit. She exhibits normal muscle tone. Coordination normal.   Skin: Skin is warm. No rash noted. No pallor.   Psychiatric: She has a normal mood and affect. Her behavior is normal. Judgment and thought content normal.   Stable mood overall       REVIEWED DATA      Labs:    Lab Results   Component Value Date     (L) 08/17/2020    K 3.7 08/17/2020    CALCIUM 9.0 08/17/2020    AST 19 08/17/2020    ALT 11 08/17/2020    BUN 6 (L) 08/17/2020    CREATININE 0.7 08/17/2020    CREATININE 0.75 03/26/2020    CREATININE 0.85 06/05/2015    EGFRIFNONA 93 03/26/2020    EGFRIFAFRI 113 03/26/2020       Lab Results   Component Value Date    GLU 97 08/17/2020    HGBA1C 4.70 (L) 03/26/2020    TSH 1.740 03/26/2020    FREET4 1.11 03/26/2020       Lab Results   Component Value Date    LDL 84 03/26/2020    HDL 64 (H) 03/26/2020    TRIG 99 03/26/2020    CHOLHDLRATIO 2.63 03/26/2020       No results found for: [id number]     Lab Results   Component Value Date    WBC 18.93 (H) 08/17/2020    HGB 13.4 08/17/2020    MCV 94.3 08/17/2020     08/17/2020       Lab Results   Component Value Date    PROTEIN Negative 03/26/2020    GLUCOSEU Negative 03/26/2020    BLOODU See below: (A) 03/26/2020    NITRITEU Negative 03/26/2020    LEUKOCYTESUR See below: (A) 03/26/2020        No results found for: HEPCVIRUSABY    Imaging:        Medical Tests:        ASSESSMENT & PLAN     ANNUAL WELLNESS EXAM / PHYSICAL     Other medical problems  addressed today:  Problem List Items Addressed This Visit        High    GHAZAL (generalized anxiety disorder) (Chronic)    Overview     3/4/16 started sertraline         Current Assessment & Plan     Overall stable on sertraline 100 mg qd.   Recommended continuing at this dose.          Relevant Medications    sertraline (ZOLOFT) 100 MG tablet       Medium    Social anxiety disorder (Chronic)    Overview     Same as per GHAZAL.          Relevant Medications    sertraline (ZOLOFT) 100 MG tablet       Low    Recurrent UTI    Current Assessment & Plan     Managed by OB/gyne. Discussed prevention / treatment strategy.   Last month was in ED with ascending UTI infection treated with Levaquin.            Other Visit Diagnoses     Encounter for annual health examination    -  Primary          Summary/Discussion:         Next Appointment with me: Visit date not found    Return in about 6 months (around 3/1/2021) for Anxiety.      HEALTHCARE MAINTENANCE ISSUES     Cancer Screening:  · Colon: Initial/Next screening at age: 43  · Repeat colon cancer screening: N/A at this time  · Breast: Recommended monthly self exams; annual professional exam  · Mammogram: N/A at this time  · Cervical: 1 year or pelvic exam as recommended by gyne  · Skin: Monthly self skin examination, annual exam by health professional  · Lung: Does not meet criteria for lung cancer screening.   · Other:    Screening Labs & Tests:  · Lab results reviewed & discussed with the patient or test orders placed today.  · EKG:  · CV Screening: Lipid panel  · DEXA (65+ or postmenopausal with risk factors):   · HEP C (If born [birth year]15[birth year], or risk factors): Will check next time  · Other:     Immunization/Vaccinations (to be given today unless deferred by patient)  · Influenza: Patient plans to get the flu vaccine at pharmacy/work/outside facility  · Hepatitis A: Verify immunization records  · Tetanus/Pertussis: Up to date  · Pneumovax: Not needed at this time  · Shingles: Not  needed at this time  · Other:     Lifestyle Counseling:  · Lifestyle Modifications: Discussed better management of stress/anxiety and Discussed sexual issues, safe sex practices, contraception  · Safety Issues: Always wear seatbelt, Avoid texting while driving   · Use sunscreen, regular skin examination  · Recommended annual dental/vision examination.  · Emotional/Stress/Sleep: Reviewed and  given when appropriate      Health Maintenance   Topic Date Due   • TDAP/TD VACCINES (1 - Tdap) 05/12/2004   • HEPATITIS C SCREENING  03/04/2016   • INFLUENZA VACCINE  10/26/2020 (Originally 8/1/2020)   • PAP SMEAR  01/14/2021   • ANNUAL PHYSICAL  09/02/2021         **Dragon Disclaimer:   Much of this encounter note is an electronic transcription/translation of spoken language to printed text. The electronic translation of spoken language may permit erroneous, or at times, nonsensical words or phrases to be inadvertently transcribed. Although I have reviewed the note for such errors, some may still exist.    Template created by Brandon Meeks MD

## 2020-09-01 NOTE — ASSESSMENT & PLAN NOTE
Managed by OB/gyne. Discussed prevention / treatment strategy.   Last month was in ED with ascending UTI infection treated with Levaquin.

## 2020-10-15 DIAGNOSIS — J45.20 MILD INTERMITTENT ASTHMA WITHOUT COMPLICATION: Chronic | ICD-10-CM

## 2020-10-15 RX ORDER — ALBUTEROL SULFATE 90 UG/1
2 AEROSOL, METERED RESPIRATORY (INHALATION) EVERY 4 HOURS PRN
Qty: 8 G | Refills: 1 | Status: SHIPPED | OUTPATIENT
Start: 2020-10-15 | End: 2022-04-20 | Stop reason: SDUPTHER

## 2021-02-09 DIAGNOSIS — F41.1 GENERALIZED ANXIETY DISORDER: ICD-10-CM

## 2021-02-09 RX ORDER — SERTRALINE HYDROCHLORIDE 100 MG/1
100 TABLET, FILM COATED ORAL DAILY
Qty: 90 TABLET | Refills: 1 | Status: SHIPPED | OUTPATIENT
Start: 2021-02-09 | End: 2021-10-28 | Stop reason: SDUPTHER

## 2021-10-28 ENCOUNTER — OFFICE VISIT (OUTPATIENT)
Dept: INTERNAL MEDICINE | Age: 26
End: 2021-10-28

## 2021-10-28 VITALS
OXYGEN SATURATION: 98 % | HEART RATE: 94 BPM | WEIGHT: 143 LBS | BODY MASS INDEX: 22.44 KG/M2 | SYSTOLIC BLOOD PRESSURE: 112 MMHG | DIASTOLIC BLOOD PRESSURE: 72 MMHG | HEIGHT: 67 IN | TEMPERATURE: 97.7 F

## 2021-10-28 DIAGNOSIS — F41.1 GENERALIZED ANXIETY DISORDER: Primary | ICD-10-CM

## 2021-10-28 PROCEDURE — 99213 OFFICE O/P EST LOW 20 MIN: CPT | Performed by: NURSE PRACTITIONER

## 2021-10-28 PROCEDURE — 90471 IMMUNIZATION ADMIN: CPT | Performed by: NURSE PRACTITIONER

## 2021-10-28 PROCEDURE — 90686 IIV4 VACC NO PRSV 0.5 ML IM: CPT | Performed by: NURSE PRACTITIONER

## 2021-10-28 RX ORDER — SERTRALINE HYDROCHLORIDE 100 MG/1
100 TABLET, FILM COATED ORAL DAILY
Qty: 90 TABLET | Refills: 1 | Status: SHIPPED | OUTPATIENT
Start: 2021-10-28 | End: 2022-04-20 | Stop reason: SDUPTHER

## 2021-10-28 NOTE — PROGRESS NOTES
"    I N T E R N A L  M E D I C I N E  [facility]      ENCOUNTER DATE:  10/28/2021    Liz Chapman / 26 y.o. / female      CHIEF COMPLAINT / REASON FOR OFFICE VISIT     Anxiety      ASSESSMENT & PLAN     1. Generalized anxiety disorder  -Continue current daily SSRI  - sertraline (ZOLOFT) 100 MG tablet; Take 1 tablet by mouth Daily.  Dispense: 90 tablet; Refill: 1    Orders Placed This Encounter   Procedures   • FluLaval/Fluarix/Fluzone >6 Months (2032-6505)     New Medications Ordered This Visit   Medications   • sertraline (ZOLOFT) 100 MG tablet     Sig: Take 1 tablet by mouth Daily.     Dispense:  90 tablet     Refill:  1       SUMMARY/DISCUSSION  • Recommended in 6 months for annual physical and to obtain lab updates at that time.    Next Appointment with me: Visit date not found    Return in about 6 months (around 4/28/2022) for Annual physical, Next scheduled follow up.      VITAL SIGNS     Visit Vitals  /72 (Cuff Size: Adult)   Pulse 94   Temp 97.7 °F (36.5 °C) (Temporal)   Ht 170.2 cm (67\")   Wt 64.9 kg (143 lb)   LMP 10/21/2021 (Exact Date)   SpO2 98%   Breastfeeding No   BMI 22.40 kg/m²     Wt Readings from Last 3 Encounters:   10/28/21 64.9 kg (143 lb)   09/01/20 65.8 kg (145 lb)   12/02/19 67.6 kg (149 lb)     Body mass index is 22.4 kg/m².      MEDICATIONS AT THE TIME OF OFFICE VISIT     Current Outpatient Medications on File Prior to Visit   Medication Sig   • albuterol sulfate  (90 Base) MCG/ACT inhaler Inhale 2 puffs Every 4 (Four) Hours As Needed for Wheezing or Shortness of Air.   • norethindrone-ethinyl estradiol-iron (MICROGESTIN FE1.5/30) 1.5-30 MG-MCG tablet Take  by mouth daily.   • propranolol (INDERAL) 10 MG tablet TAKE 1 TO 2 TABLETS BY MOUTH DAILY AS NEEDED   • [DISCONTINUED] sertraline (ZOLOFT) 100 MG tablet Take 1 tablet by mouth Daily.     No current facility-administered medications on file prior to visit.         HISTORY OF PRESENT ILLNESS     Analyzed anxiety " disorder: Stable with current sertraline 100 mg daily.  No longer using propanolol 10 mg tablet.  Denies any significant tremors or heart palpitations.  No panic attack.  She has had some minor increase in anxiety lately due to change to an outpatient physical therapy clinic, otherwise symptoms are controlled.  She would like to continue sertraline 100 mg daily.    REVIEW OF SYSTEMS     Constitutional neg except per HPI   Resp neg  CV neg  Psych stable anxiety       PHYSICAL EXAMINATION     Physical Exam  Constitutional  No distress  Cardiovascular Rate  normal . Rhythm: regular . Heart sounds:  normal  Pulmonary/Chest  Effort normal. Breath sounds:  normal  Psychiatric  Alert. Judgment and thought content normal. Mood normal       REVIEWED DATA     Labs:   Lab Results   Component Value Date    BUN 6 (L) 08/17/2020    CREATININE 0.7 08/17/2020    EGFRIFNONA 93 03/26/2020    EGFRIFAFRI 113 03/26/2020    BCR 8.6 08/17/2020    K 3.7 08/17/2020    CO2 25 08/17/2020    CALCIUM 9.0 08/17/2020    PROTENTOTREF 7.0 03/26/2020    ALBUMIN 3.9 08/17/2020    LABIL2 1.1 08/17/2020    AST 19 08/17/2020    ALT 11 08/17/2020         Imaging:           Medical Tests:             Summary of old records / correspondence / consultant report:           Request outside records:           *Examiner was wearing medical surgical mask, face shield and exam gloves during the entire duration of the visit. Patient was masked the entire time.   Minimum social distance of 6 ft maintained entire visit except if physical contact was necessary as documented.     **Dragon Disclaimer:   Much of this encounter note is an electronic transcription/translation of spoken language to printed text. The electronic translation of spoken language may permit erroneous, or at times, nonsensical words or phrases to be inadvertently transcribed. Although I have reviewed the note for such errors, some may still exist.

## 2022-02-25 DIAGNOSIS — F41.1 GENERALIZED ANXIETY DISORDER: ICD-10-CM

## 2022-02-25 RX ORDER — SERTRALINE HYDROCHLORIDE 100 MG/1
100 TABLET, FILM COATED ORAL DAILY
Qty: 90 TABLET | Refills: 1 | Status: CANCELLED | OUTPATIENT
Start: 2022-02-25

## 2022-04-20 ENCOUNTER — OFFICE VISIT (OUTPATIENT)
Dept: INTERNAL MEDICINE | Age: 27
End: 2022-04-20

## 2022-04-20 VITALS
BODY MASS INDEX: 23.7 KG/M2 | WEIGHT: 151 LBS | SYSTOLIC BLOOD PRESSURE: 118 MMHG | OXYGEN SATURATION: 99 % | DIASTOLIC BLOOD PRESSURE: 76 MMHG | HEART RATE: 83 BPM | HEIGHT: 67 IN | TEMPERATURE: 98 F

## 2022-04-20 DIAGNOSIS — J45.20 MILD INTERMITTENT ASTHMA WITHOUT COMPLICATION: Chronic | ICD-10-CM

## 2022-04-20 DIAGNOSIS — F41.1 GENERALIZED ANXIETY DISORDER: ICD-10-CM

## 2022-04-20 DIAGNOSIS — Z00.00 ANNUAL PHYSICAL EXAM: Primary | ICD-10-CM

## 2022-04-20 DIAGNOSIS — F41.1 GAD (GENERALIZED ANXIETY DISORDER): Chronic | ICD-10-CM

## 2022-04-20 DIAGNOSIS — Z11.59 NEED FOR HEPATITIS C SCREENING TEST: ICD-10-CM

## 2022-04-20 PROCEDURE — 99395 PREV VISIT EST AGE 18-39: CPT | Performed by: NURSE PRACTITIONER

## 2022-04-20 RX ORDER — ALBUTEROL SULFATE 90 UG/1
2 AEROSOL, METERED RESPIRATORY (INHALATION) EVERY 4 HOURS PRN
Qty: 8 G | Refills: 1 | Status: SHIPPED | OUTPATIENT
Start: 2022-04-20

## 2022-04-20 RX ORDER — SERTRALINE HYDROCHLORIDE 100 MG/1
100 TABLET, FILM COATED ORAL DAILY
Qty: 90 TABLET | Refills: 1 | Status: SHIPPED | OUTPATIENT
Start: 2022-04-20 | End: 2022-10-17

## 2022-04-20 NOTE — PROGRESS NOTES
"    I N T E R N A L  M E D I C I N E  Annalisa Sam, APRN      ENCOUNTER DATE:  04/20/2022    Liz Chapman / 26 y.o. / female      CHIEF COMPLAINT     Annual Health Examination and Anxiety      VITALS     Visit Vitals  /76   Pulse 83   Temp 98 °F (36.7 °C) (Temporal)   Ht 170.2 cm (67.01\")   Wt 68.5 kg (151 lb)   LMP 03/26/2022   SpO2 99%   BMI 23.64 kg/m²       BP Readings from Last 3 Encounters:   04/20/22 118/76   10/28/21 112/72   09/01/20 120/82     Wt Readings from Last 3 Encounters:   04/20/22 68.5 kg (151 lb)   10/28/21 64.9 kg (143 lb)   09/01/20 65.8 kg (145 lb)      Body mass index is 23.64 kg/m².    Blood pressure readings recorded on patient flowsheet:  No flowsheet data found.       MEDICATIONS     Current Outpatient Medications on File Prior to Visit   Medication Sig Dispense Refill   • norethindrone-ethinyl estradiol-iron (MICROGESTIN FE1.5/30) 1.5-30 MG-MCG tablet Take  by mouth daily.     • [DISCONTINUED] albuterol sulfate  (90 Base) MCG/ACT inhaler Inhale 2 puffs Every 4 (Four) Hours As Needed for Wheezing or Shortness of Air. 8 g 1   • [DISCONTINUED] propranolol (INDERAL) 10 MG tablet TAKE 1 TO 2 TABLETS BY MOUTH DAILY AS NEEDED 180 tablet 1   • [DISCONTINUED] sertraline (ZOLOFT) 100 MG tablet Take 1 tablet by mouth Daily. 90 tablet 1     No current facility-administered medications on file prior to visit.         HISTORY OF PRESENT ILLNESS     Liz 26 year old female presents for annual health maintenance visit. Patient states had stopped Zoloft for a while but anxiety got worse so she restarted it. States anxiety is controlled at this time. States Asthma currently controlled. Negative for shortness of breath or wheezing.     · General health: excellent  · Lifestyle:  · Attempting to lose weight?: No   · Diet: eats a well balanced, healthy diet  · Exercise: exercises nearly every day and very active at work/home  · Tobacco: Never used   · Alcohol: occasional/infrequent  · Work: " Full-time  · Reproductive health:  · Sexually active?: Yes   · Sexual problems?: No problems  · Concern for STD?: No    · Sees Gynecologist?: Yes   · Yady/Postmenopausal?: No   · Domestic abuse concerns: No   · Depression Screening:      PHQ-2/PHQ-9 Depression Screening 4/20/2022   Retired PHQ-9 Total Score -   Retired Total Score -   Little Interest or Pleasure in Doing Things 0-->not at all   Feeling Down, Depressed or Hopeless 0-->not at all   PHQ-9: Brief Depression Severity Measure Score 0         PHQ-2: 0 (Not depressed)   PHQ-9: 0 (Negative screening for depression)    Patient Care Team:  Carlos Meeks MD as PCP - General (Internal Medicine)  Ai Quiroz MD as Consulting Physician (Obstetrics and Gynecology)      ALLERGIES  Allergies   Allergen Reactions   • No Known Drug Allergy         PFSH:     The following portions of the patient's history were reviewed and updated as appropriate: Allergies / Current Medications / Past Medical History / Surgical History / Social History / Family History    PROBLEM LIST   Patient Active Problem List   Diagnosis   • GHAZAL (generalized anxiety disorder)   • Social anxiety disorder   • Mild intermittent asthma   • Performance anxiety   • Recurrent UTI       PAST MEDICAL HISTORY  Past Medical History:   Diagnosis Date   • Anxiety    • Recurrent UTI    • Tonsil stone        SURGICAL HISTORY  History reviewed. No pertinent surgical history.    SOCIAL HISTORY  Social History     Socioeconomic History   • Marital status: Single   Tobacco Use   • Smoking status: Never Smoker   • Smokeless tobacco: Never Used   Vaping Use   • Vaping Use: Never used   Substance and Sexual Activity   • Alcohol use: Yes     Alcohol/week: 4.0 standard drinks     Types: 4 Standard drinks or equivalent per week   • Drug use: No     Comment: previous recreation marijuana use, not currently   • Sexual activity: Yes     Partners: Male     Birth control/protection: OCP       FAMILY HISTORY  Family  History   Problem Relation Age of Onset   • Alcohol abuse Father    • Osteoarthritis Father    • Alcohol abuse Sister    • No Known Problems Mother    • No Known Problems Maternal Grandmother    • No Known Problems Sister    • Depression Neg Hx    • Bipolar disorder Neg Hx    • Cancer Neg Hx    • Diabetes Neg Hx        IMMUNIZATION HISTORY  Immunization History   Administered Date(s) Administered   • FluLaval/Fluarix/Fluzone >6 10/28/2021   • Fluzone Split Quad (Multi-dose) 11/02/2019   • Hep A, 2 Dose 09/17/2007, 10/08/2008   • Hep B, Adolescent or Pediatric 1993, 1993, 02/01/1994   • Hepatitis B 01/04/2018, 02/15/2018   • Hepatitis B Vaccine Adult IM 02/15/2018   • MMR 05/11/1994, 07/10/1998   • PPD Test 03/01/2019   • Tdap 01/20/2021   • Varicella 08/13/2003, 10/05/2008         REVIEW OF SYSTEMS     Review of Systems   Constitutional: Negative.    HENT: Negative.    Eyes: Negative.    Respiratory: Negative.    Cardiovascular: Negative.    Gastrointestinal: Negative.    Genitourinary: Negative.    Musculoskeletal: Negative.    Skin: Negative.    Allergic/Immunologic: Negative.    Neurological: Negative.    Hematological: Negative.    Psychiatric/Behavioral: The patient is nervous/anxious.        PHYSICAL EXAMINATION     Physical Exam  Constitutional:       Appearance: Normal appearance. She is normal weight.   HENT:      Head: Normocephalic.      Nose: Nose normal.      Mouth/Throat:      Mouth: Mucous membranes are moist.   Eyes:      Extraocular Movements: Extraocular movements intact.      Pupils: Pupils are equal, round, and reactive to light.   Cardiovascular:      Rate and Rhythm: Normal rate and regular rhythm.      Pulses: Normal pulses.      Heart sounds: Normal heart sounds.   Pulmonary:      Effort: Pulmonary effort is normal.      Breath sounds: Normal breath sounds.   Abdominal:      General: Abdomen is flat. Bowel sounds are normal.   Musculoskeletal:         General: Normal range of  motion.      Cervical back: Normal range of motion and neck supple.   Skin:     General: Skin is warm and dry.      Capillary Refill: Capillary refill takes less than 2 seconds.   Neurological:      Mental Status: She is alert and oriented to person, place, and time.   Psychiatric:         Mood and Affect: Mood normal.         Behavior: Behavior normal.         Thought Content: Thought content normal.         Judgment: Judgment normal.         REVIEWED DATA      Labs:    Lab Results   Component Value Date     (L) 08/17/2020    K 3.7 08/17/2020    CALCIUM 9.0 08/17/2020    AST 19 08/17/2020    ALT 11 08/17/2020    BUN 6 (L) 08/17/2020    CREATININE 0.7 08/17/2020    CREATININE 0.75 03/26/2020    CREATININE 0.85 06/05/2015    EGFRIFNONA 93 03/26/2020    EGFRIFAFRI 113 03/26/2020       Lab Results   Component Value Date    GLUCOSE 99 03/26/2020    HGBA1C 4.70 (L) 03/26/2020    TSH 1.740 03/26/2020    FREET4 1.11 03/26/2020       Lab Results   Component Value Date    LDL 84 03/26/2020    HDL 64 (H) 03/26/2020    TRIG 99 03/26/2020    CHOLHDLRATIO 2.63 03/26/2020       No results found for: [id number]     Lab Results   Component Value Date    WBC 18.93 (H) 08/17/2020    HGB 13.4 08/17/2020    MCV 94.3 08/17/2020     08/17/2020       Lab Results   Component Value Date    PROTEIN Negative 03/26/2020    GLUCOSEU Negative 03/26/2020    BLOODU See below: (A) 03/26/2020    NITRITEU Negative 03/26/2020    LEUKOCYTESUR See below: (A) 03/26/2020        No results found for: HEPCVIRUSABY    Imaging:        Medical Tests:        ASSESSMENT & PLAN     ANNUAL WELLNESS EXAM / PHYSICAL     Other medical problems addressed today:  Problem List Items Addressed This Visit        Mental Health    GHAZAL (generalized anxiety disorder) (Chronic)    Overview     3/4/16 started sertraline           Relevant Medications    sertraline (ZOLOFT) 100 MG tablet       Pulmonary and Pneumonias    Mild intermittent asthma (Chronic)    Relevant  Medications    albuterol sulfate  (90 Base) MCG/ACT inhaler      Other Visit Diagnoses     Annual physical exam    -  Primary    Relevant Orders    CBC & Differential    Comprehensive Metabolic Panel    Hemoglobin A1c    Lipid Panel With / Chol / HDL Ratio    T4, Free    TSH    Urinalysis With Microscopic If Indicated (No Culture) - Urine, Clean Catch    Need for hepatitis C screening test        Relevant Orders    Hepatitis C Antibody    Generalized anxiety disorder        Relevant Medications    sertraline (ZOLOFT) 100 MG tablet          Summary/Discussion:     • I spent 30 min in direct care of this patient on this date of service. This time includes times spent by me in the following activities: Preparing for the visit, obtaining and/or reviewing a separately obtained history, performing a medically appropriate examination and/or evaluation, reviewing medical records, reviewing tests, ordering medications, tests, or procedures, counseling and educating the patient, documenting information in the medical record and reviewing office note/correspondence from other providers.     Return in about 1 year (around 4/20/2023) for Annual physical.      HEALTHCARE MAINTENANCE ISSUES     Cancer Screening:  · Colon: Initial/Next screening at age: 43  · Repeat colon cancer screening: N/A at this time  · Breast: Recommended monthly self exams; annual professional exam  · Mammogram: every 1 year  · Cervical: 1 year  · Skin: Monthly self skin examination, annual exam by health professional  · Lung: Does not meet criteria for lung cancer screening.   · Other:    Screening Labs & Tests:  · Lab results reviewed & discussed with the patient or test orders placed today.  · EKG:  · CV Screening: No special screening needed  · DEXA (65+ or postmenopausal with risk factors):   · HEP C (If born [birth year]39-[birth year], or risk factors): Not indicated  · Other:     Immunization/Vaccinations (to be given today unless deferred by  patient)  · Influenza: Patient had the flu shot this season  · Hepatitis A: Up to date  · Hepatitis B: Up to date  · Tetanus/Pertussis: Up to date  · Pneumovax: Not needed at this time  · Shingles: Not needed at this time  · COVID: JEROD JOYNER COVID: Completed primary vaccine series and booster    Lifestyle Counseling:  · Lifestyle Modifications: Reduce exposure to stress if possible and Discussed better management of stress/anxiety  · Safety Issues: Always wear seatbelt, Avoid texting while driving   · Use sunscreen, regular skin examination  · Recommended annual dental/vision examination.  · Emotional/Stress/Sleep: Reviewed and  given when appropriate      Health Maintenance   Topic Date Due   • Pneumococcal Vaccine 0-64 (1 - PCV) Never done   • HEPATITIS C SCREENING  Never done   • COVID-19 Vaccine (3 - Booster for Pfizer series) 07/03/2021   • ANNUAL PHYSICAL  09/02/2021   • INFLUENZA VACCINE  08/01/2022   • PAP SMEAR  03/24/2023   • TDAP/TD VACCINES (2 - Td or Tdap) 01/20/2031           *Examiner was wearing [id number] mask and eye protection during the entire duration of the visit. Patient was masked the entire time. Minimum social distance of 6 ft maintained entire visit except if physical contact was necessary as documented.       Template created by SCOTT Joseph

## 2022-06-22 ENCOUNTER — OFFICE VISIT (OUTPATIENT)
Dept: INTERNAL MEDICINE | Age: 27
End: 2022-06-22

## 2022-06-22 VITALS
HEART RATE: 82 BPM | BODY MASS INDEX: 23.7 KG/M2 | HEIGHT: 67 IN | TEMPERATURE: 97.5 F | DIASTOLIC BLOOD PRESSURE: 82 MMHG | SYSTOLIC BLOOD PRESSURE: 136 MMHG | OXYGEN SATURATION: 98 % | WEIGHT: 151 LBS

## 2022-06-22 DIAGNOSIS — R31.9 HEMATURIA, UNSPECIFIED TYPE: ICD-10-CM

## 2022-06-22 DIAGNOSIS — F41.1 GAD (GENERALIZED ANXIETY DISORDER): Primary | Chronic | ICD-10-CM

## 2022-06-22 DIAGNOSIS — Z87.448 H/O CHRONIC CYSTITIS: ICD-10-CM

## 2022-06-22 DIAGNOSIS — J45.20 MILD INTERMITTENT ASTHMA WITHOUT COMPLICATION: Chronic | ICD-10-CM

## 2022-06-22 PROCEDURE — 99395 PREV VISIT EST AGE 18-39: CPT

## 2022-06-22 NOTE — PROGRESS NOTES
"    I N T E R N A L  M E D I C I N E  Cinda Steinerfabiano, APRN      ENCOUNTER DATE:  06/22/2022    Liz Chapman / 27 y.o. / female      CHIEF COMPLAINT     Annual Exam    Anxiety: well controlled on zoloft 100 mg.  No SI/HI.    Asthma: well controlled.  Very rare symptoms, which will require albuterol inhaler use, primarily associated with ragweed bloom.    Hx of recurrent cystitis.  Reports approximately 8 cystitis episodes yearly.  Last urinalysis showed presence of blood.  Has not had a urology workup.  Reports OBGYN prescribes macrobid for which she takes preventatively at the very onset of any symptoms.      Recommended to re establish with dermatology.   Sees OBGYN yearly and is uptodate on pap.    VITALS     Visit Vitals  /82   Pulse 82   Temp 97.5 °F (36.4 °C) (Temporal)   Ht 170.2 cm (67.01\")   Wt 68.5 kg (151 lb)   LMP 05/24/2022   SpO2 98%   BMI 23.64 kg/m²       BP Readings from Last 3 Encounters:   06/22/22 136/82   04/20/22 118/76   10/28/21 112/72     Wt Readings from Last 3 Encounters:   06/22/22 68.5 kg (151 lb)   04/20/22 68.5 kg (151 lb)   10/28/21 64.9 kg (143 lb)      Body mass index is 23.64 kg/m².       MEDICATIONS     Current Outpatient Medications on File Prior to Visit   Medication Sig Dispense Refill   • albuterol sulfate  (90 Base) MCG/ACT inhaler Inhale 2 puffs Every 4 (Four) Hours As Needed for Wheezing or Shortness of Air. 8 g 1   • norethindrone-ethinyl estradiol-iron (MICROGESTIN FE1.5/30) 1.5-30 MG-MCG tablet Take  by mouth daily.     • sertraline (ZOLOFT) 100 MG tablet Take 1 tablet by mouth Daily. 90 tablet 1     No current facility-administered medications on file prior to visit.         HISTORY OF PRESENT ILLNESS     Liz presents for annual health maintenance visit.    · General health: good  · Lifestyle:  · Attempting to lose weight?: Yes   · Diet: eats moderately healthy, drinks on the weekend & eats more during summer  · Exercise: exercises 4 days/week, resistance " training & cardio  · Tobacco: Never used   · Alcohol: 1-2 occasions/week  · Work: Full-time  · Reproductive health:  · Sexually active?: Yes   · Sexual problems?: No problems  · Concern for STD?: No    · Sees Gynecologist?: Yes   · Yady/Postmenopausal?: No   · Domestic abuse concerns: No   · Depression Screening:      PHQ-2/PHQ-9 Depression Screening 4/20/2022   Retired PHQ-9 Total Score -   Retired Total Score -   Little Interest or Pleasure in Doing Things 0-->not at all   Feeling Down, Depressed or Hopeless 0-->not at all   PHQ-9: Brief Depression Severity Measure Score 0         PHQ-2: 0 (Not depressed)   PHQ-9: 0 (Negative screening for depression)    Patient Care Team:  Carlos Meeks MD as PCP - General (Internal Medicine)  Ai Quiroz MD as Consulting Physician (Obstetrics and Gynecology)      ALLERGIES  Allergies   Allergen Reactions   • No Known Drug Allergy         PFSH:     The following portions of the patient's history were reviewed and updated as appropriate: Allergies / Current Medications / Past Medical History / Surgical History / Social History / Family History    PROBLEM LIST   Patient Active Problem List   Diagnosis   • GHAZAL (generalized anxiety disorder)   • Social anxiety disorder   • Mild intermittent asthma   • Performance anxiety   • Recurrent UTI   • Acute maxillary sinusitis   • Acute otitis media, left   • Tonsillitis       PAST MEDICAL HISTORY  Past Medical History:   Diagnosis Date   • Anxiety    • Recurrent UTI    • Tonsil stone        SURGICAL HISTORY  History reviewed. No pertinent surgical history.    SOCIAL HISTORY  Social History     Socioeconomic History   • Marital status: Single   Tobacco Use   • Smoking status: Never Smoker   • Smokeless tobacco: Never Used   Vaping Use   • Vaping Use: Never used   Substance and Sexual Activity   • Alcohol use: Yes     Alcohol/week: 4.0 standard drinks     Types: 4 Standard drinks or equivalent per week   • Drug use: No     Comment:  previous recreation marijuana use, not currently   • Sexual activity: Yes     Partners: Male     Birth control/protection: OCP       FAMILY HISTORY  Family History   Problem Relation Age of Onset   • Alcohol abuse Father    • Osteoarthritis Father    • Alcohol abuse Sister    • No Known Problems Mother    • No Known Problems Maternal Grandmother    • No Known Problems Sister    • Depression Neg Hx    • Bipolar disorder Neg Hx    • Cancer Neg Hx    • Diabetes Neg Hx        IMMUNIZATION HISTORY  Immunization History   Administered Date(s) Administered   • COVID-19 (PFIZER) PURPLE CAP 01/13/2021, 02/03/2021   • FluLaval/Fluarix/Fluzone >6 10/28/2021   • Fluzone Split Quad (Multi-dose) 11/02/2019   • Hep A, 2 Dose 09/17/2007, 10/08/2008   • Hep B, Adolescent or Pediatric 1993, 1993, 02/01/1994   • Hepatitis B 01/04/2018, 02/15/2018   • Hepatitis B Vaccine Adult IM 02/15/2018   • MMR 05/11/1994, 07/10/1998   • PPD Test 03/01/2019   • Tdap 01/20/2021   • Varicella 08/13/2003, 10/05/2008         REVIEW OF SYSTEMS     Review of Systems   Constitutional: Negative for chills, fatigue and fever.   Respiratory: Negative for cough, shortness of breath and wheezing.    Cardiovascular: Negative for chest pain, palpitations and leg swelling.   Gastrointestinal: Negative for abdominal pain and blood in stool.   Genitourinary: Negative for difficulty urinating, dysuria, frequency and urgency.   Neurological: Negative for headaches.   Psychiatric/Behavioral: Negative for suicidal ideas. The patient is not nervous/anxious.          PHYSICAL EXAMINATION     Physical Exam  Vitals reviewed.   Constitutional:       General: She is not in acute distress.     Appearance: Normal appearance. She is not ill-appearing, toxic-appearing or diaphoretic.   HENT:      Head: Normocephalic and atraumatic.      Right Ear: Tympanic membrane, ear canal and external ear normal. There is no impacted cerumen.      Left Ear: Tympanic membrane, ear  canal and external ear normal. There is no impacted cerumen.      Nose: Nose normal. No congestion or rhinorrhea.      Mouth/Throat:      Mouth: Mucous membranes are moist.      Pharynx: Oropharynx is clear. No oropharyngeal exudate or posterior oropharyngeal erythema.   Eyes:      Extraocular Movements: Extraocular movements intact.      Conjunctiva/sclera: Conjunctivae normal.      Pupils: Pupils are equal, round, and reactive to light.   Cardiovascular:      Rate and Rhythm: Normal rate and regular rhythm.      Heart sounds: Normal heart sounds.   Pulmonary:      Effort: Pulmonary effort is normal. No respiratory distress.      Breath sounds: Normal breath sounds.   Abdominal:      General: Bowel sounds are normal.      Palpations: Abdomen is soft.      Tenderness: There is no abdominal tenderness.   Musculoskeletal:         General: No swelling or deformity. Normal range of motion.      Cervical back: Normal range of motion and neck supple.      Right lower leg: No edema.      Left lower leg: No edema.   Lymphadenopathy:      Cervical: No cervical adenopathy.   Skin:     General: Skin is warm and dry.   Neurological:      General: No focal deficit present.      Mental Status: She is alert and oriented to person, place, and time. Mental status is at baseline.   Psychiatric:         Mood and Affect: Mood normal.         Behavior: Behavior normal.         Thought Content: Thought content normal.         Judgment: Judgment normal.         REVIEWED DATA      Labs:    Lab Results   Component Value Date     06/15/2022    K 4.1 06/15/2022    CALCIUM 9.4 06/15/2022    AST 14 06/15/2022    ALT 10 06/15/2022    BUN 12 06/15/2022    CREATININE 0.83 06/15/2022    CREATININE 0.7 08/17/2020    CREATININE 0.75 03/26/2020    EGFRIFNONA 93 03/26/2020    EGFRIFAFRI 113 03/26/2020       Lab Results   Component Value Date    GLUCOSE 86 06/15/2022    HGBA1C 4.8 06/15/2022    HGBA1C 4.70 (L) 03/26/2020    TSH 3.580 06/15/2022     FREET4 1.03 06/15/2022       Lab Results   Component Value Date    LDL 75 06/15/2022    HDL 67 06/15/2022    TRIG 124 06/15/2022    CHOLHDLRATIO 2.4 06/15/2022       No results found for: [id number]     Lab Results   Component Value Date    WBC 6.4 06/15/2022    HGB 13.7 06/15/2022    MCV 97 06/15/2022     06/15/2022       Lab Results   Component Value Date    PROTEIN Negative 06/15/2022    GLUCOSEU Negative 06/15/2022    BLOODU Trace (A) 06/15/2022    NITRITEU Negative 06/15/2022    LEUKOCYTESUR Trace (A) 06/15/2022          Lab Results   Component Value Date    HEPCVIRUSABY <0.1 06/15/2022       Imaging:        Medical Tests:        ASSESSMENT & PLAN     ANNUAL WELLNESS EXAM / PHYSICAL     Diagnoses and all orders for this visit:    1. GHAZAL (generalized anxiety disorder) (Primary)  Overview:  Symptoms well controlled.  Continue Zoloft 100 mg.      2. Mild intermittent asthma without complication  Overview:  Well controlled.  Continue albuterol inhaler PRN.      3. H/O chronic cystitis  -     Ambulatory Referral to Urology    4. Hematuria, unspecified type  -     Ambulatory Referral to Urology       Summary/Discussion:     • Follow up in 6 months for anxiety recheck.  • Referral placed to urology for evaluation of recurrent cystitis and hematuria.      Next Appointment with me: Visit date not found    Return in about 6 months (around 12/22/2022) for Recheck (anxiety).      HEALTHCARE MAINTENANCE ISSUES     Cancer Screening:  · Colon: Initial/Next screening at age: 43  · Repeat colon cancer screening: N/A at this time  · Breast: Recommended monthly self exams; annual professional exam  · Mammogram: N/A at this time  · Cervical: 1 year  · Skin: Monthly self skin examination, annual exam by health professional  · Lung: Does not meet criteria for lung cancer screening.   · Other:    Screening Labs & Tests:  · Lab results reviewed & discussed with the patient or test orders placed today.  · EKG:  · CV Screening:  Lipid panel  · DEXA (65+ or postmenopausal with risk factors):   · HEP C (If born [birth year]12-[birth year], or risk factors): Negative screen  · Other:     Immunization/Vaccinations (to be given today unless deferred by patient)  · Influenza: Patient had the flu shot this season  · Hepatitis A: Verify immunization records  · Hepatitis B: Up to date  · Tetanus/Pertussis: Up to date  · Pneumovax: Not interested at this time  · Shingles: Not needed at this time  · COVID: JEROD JOYNER COVID: Completed primary vaccine series     Lifestyle Counseling:  · Lifestyle Modifications: Continue good lifestyle choices/modifications and Improve dietary compliance  · Safety Issues: Always wear seatbelt, Avoid texting while driving   · Use sunscreen, regular skin examination  · Recommended annual dental/vision examination.  · Emotional/Stress/Sleep: Reviewed and  given when appropriate      Health Maintenance   Topic Date Due   • Pneumococcal Vaccine 0-64 (1 - PCV) Never done   • COVID-19 Vaccine (3 - Booster for Pfizer series) 06/24/2022 (Originally 7/3/2021)   • INFLUENZA VACCINE  10/01/2022   • PAP SMEAR  03/24/2023   • ANNUAL PHYSICAL  06/23/2023   • TDAP/TD VACCINES (2 - Td or Tdap) 01/20/2031   • HEPATITIS C SCREENING  Completed

## 2022-10-17 DIAGNOSIS — F41.1 GENERALIZED ANXIETY DISORDER: ICD-10-CM

## 2022-10-17 RX ORDER — SERTRALINE HYDROCHLORIDE 100 MG/1
TABLET, FILM COATED ORAL
Qty: 90 TABLET | Refills: 3 | Status: SHIPPED | OUTPATIENT
Start: 2022-10-17 | End: 2023-02-17 | Stop reason: SDUPTHER

## 2022-10-17 NOTE — TELEPHONE ENCOUNTER
Rx Refill Note  Requested Prescriptions     Pending Prescriptions Disp Refills   • sertraline (ZOLOFT) 100 MG tablet [Pharmacy Med Name: SERTRALINE HCL TABS 100MG] 90 tablet 3     Sig: TAKE 1 TABLET DAILY      Last office visit with prescribing clinician: 4/20/2022      Next office visit with prescribing clinician: Visit date not found       {TIP  Please add Last Relevant Lab Date if appropriate:     Amanda Murillo, IZAIAH  10/17/22, 09:58 EDT

## 2023-01-11 ENCOUNTER — OFFICE VISIT (OUTPATIENT)
Dept: INTERNAL MEDICINE | Age: 28
End: 2023-01-11
Payer: COMMERCIAL

## 2023-01-11 VITALS
HEIGHT: 67 IN | DIASTOLIC BLOOD PRESSURE: 88 MMHG | SYSTOLIC BLOOD PRESSURE: 124 MMHG | HEART RATE: 75 BPM | BODY MASS INDEX: 23.07 KG/M2 | OXYGEN SATURATION: 99 % | WEIGHT: 147 LBS | TEMPERATURE: 96.8 F

## 2023-01-11 DIAGNOSIS — F41.1 GAD (GENERALIZED ANXIETY DISORDER): Primary | Chronic | ICD-10-CM

## 2023-01-11 DIAGNOSIS — F40.10 SOCIAL ANXIETY DISORDER: Chronic | ICD-10-CM

## 2023-01-11 PROCEDURE — 99213 OFFICE O/P EST LOW 20 MIN: CPT | Performed by: INTERNAL MEDICINE

## 2023-02-17 DIAGNOSIS — F41.1 GENERALIZED ANXIETY DISORDER: ICD-10-CM

## 2023-02-19 RX ORDER — SERTRALINE HYDROCHLORIDE 100 MG/1
100 TABLET, FILM COATED ORAL DAILY
Qty: 90 TABLET | Refills: 1 | Status: SHIPPED | OUTPATIENT
Start: 2023-02-19

## 2024-03-17 ENCOUNTER — PATIENT MESSAGE (OUTPATIENT)
Dept: INTERNAL MEDICINE | Age: 29
End: 2024-03-17
Payer: COMMERCIAL

## 2024-03-17 DIAGNOSIS — J45.20 MILD INTERMITTENT ASTHMA WITHOUT COMPLICATION: Chronic | ICD-10-CM

## 2024-03-18 RX ORDER — ALBUTEROL SULFATE 90 UG/1
2 AEROSOL, METERED RESPIRATORY (INHALATION) EVERY 4 HOURS PRN
Qty: 8 G | Refills: 1 | Status: SHIPPED | OUTPATIENT
Start: 2024-03-18

## 2024-03-18 NOTE — TELEPHONE ENCOUNTER
From: Liz Chapman  To: Carlos Meeks  Sent: 3/17/2024 10:06 AM EDT  Subject: Medication refill request     Dr. Meeks,    I have run out of my inhaler (albuterol sulfate  (90 Base) MCG/ACT inhaler) as I have been utilizing it more frequently due to allergy season. I am reaching out to request a refill as I can no longer do so automatically through [employer]. Please let me know if you need additional information.     Thank you!

## 2024-04-23 DIAGNOSIS — F41.1 GAD (GENERALIZED ANXIETY DISORDER): Chronic | ICD-10-CM

## 2024-04-23 DIAGNOSIS — F40.10 SOCIAL ANXIETY DISORDER: Chronic | ICD-10-CM

## 2024-04-23 RX ORDER — SERTRALINE HYDROCHLORIDE 100 MG/1
100 TABLET, FILM COATED ORAL DAILY
Qty: 90 TABLET | Refills: 1 | Status: CANCELLED | OUTPATIENT
Start: 2024-04-23

## 2024-05-02 DIAGNOSIS — F40.10 SOCIAL ANXIETY DISORDER: Chronic | ICD-10-CM

## 2024-05-02 DIAGNOSIS — F41.1 GAD (GENERALIZED ANXIETY DISORDER): Chronic | ICD-10-CM

## 2024-05-02 RX ORDER — SERTRALINE HYDROCHLORIDE 100 MG/1
100 TABLET, FILM COATED ORAL DAILY
Qty: 90 TABLET | Refills: 1 | Status: CANCELLED | OUTPATIENT
Start: 2024-05-02

## 2024-05-08 ENCOUNTER — OFFICE VISIT (OUTPATIENT)
Dept: INTERNAL MEDICINE | Age: 29
End: 2024-05-08
Payer: COMMERCIAL

## 2024-05-08 VITALS
TEMPERATURE: 97.3 F | OXYGEN SATURATION: 98 % | HEIGHT: 67 IN | BODY MASS INDEX: 24.17 KG/M2 | HEART RATE: 86 BPM | SYSTOLIC BLOOD PRESSURE: 140 MMHG | DIASTOLIC BLOOD PRESSURE: 90 MMHG | WEIGHT: 154 LBS

## 2024-05-08 DIAGNOSIS — Z00.00 ENCOUNTER FOR ANNUAL HEALTH EXAMINATION: ICD-10-CM

## 2024-05-08 DIAGNOSIS — F40.10 SOCIAL ANXIETY DISORDER: Chronic | ICD-10-CM

## 2024-05-08 DIAGNOSIS — F41.1 GAD (GENERALIZED ANXIETY DISORDER): Primary | Chronic | ICD-10-CM

## 2024-05-08 PROCEDURE — 99214 OFFICE O/P EST MOD 30 MIN: CPT | Performed by: INTERNAL MEDICINE

## 2024-05-08 RX ORDER — SERTRALINE HYDROCHLORIDE 100 MG/1
100 TABLET, FILM COATED ORAL DAILY
Qty: 90 TABLET | Refills: 1 | Status: SHIPPED | OUTPATIENT
Start: 2024-05-08

## 2024-07-02 ENCOUNTER — OFFICE VISIT (OUTPATIENT)
Dept: INTERNAL MEDICINE | Age: 29
End: 2024-07-02
Payer: COMMERCIAL

## 2024-07-02 VITALS
OXYGEN SATURATION: 100 % | SYSTOLIC BLOOD PRESSURE: 120 MMHG | BODY MASS INDEX: 24.17 KG/M2 | WEIGHT: 154 LBS | HEART RATE: 88 BPM | HEIGHT: 67 IN | DIASTOLIC BLOOD PRESSURE: 74 MMHG | RESPIRATION RATE: 16 BRPM | TEMPERATURE: 97.2 F

## 2024-07-02 DIAGNOSIS — Z00.00 ANNUAL PHYSICAL EXAM: Primary | ICD-10-CM

## 2024-07-02 DIAGNOSIS — F41.1 GAD (GENERALIZED ANXIETY DISORDER): Chronic | ICD-10-CM

## 2024-07-02 PROCEDURE — 99395 PREV VISIT EST AGE 18-39: CPT

## 2024-07-02 NOTE — PROGRESS NOTES
"    I N T E R N A L  M E D I C I N E  Cinda Aguero, APRN      ENCOUNTER DATE:  07/02/2024    Liz Chapman / 29 y.o. / female      CHIEF COMPLAINT     Annual Exam    No problems or concerns.  Now working as  with [employer]; much happier leaving healthcare.    GHAZAL: Symptoms well controlled on sertraline 100 mg daily.  No SI/ HI.      Mild intermittent asthma: Well controlled.  Rare use of albuterol inhaler PRN.  Vaping occasionally; working towards quitting.    Followed by GYN, [facility], yearly, up to date with pap.        VITALS     Visit Vitals  /74   Pulse 88   Temp 97.2 °F (36.2 °C)   Resp 16   Ht 170.2 cm (67.01\")   Wt 69.9 kg (154 lb)   SpO2 100%   BMI 24.11 kg/m²       BP Readings from Last 3 Encounters:   07/02/24 120/74   05/08/24 140/90   01/11/23 124/88     Wt Readings from Last 3 Encounters:   07/02/24 69.9 kg (154 lb)   05/08/24 69.9 kg (154 lb)   01/11/23 66.7 kg (147 lb)      Body mass index is 24.11 kg/m².       MEDICATIONS     Current Outpatient Medications on File Prior to Visit   Medication Sig Dispense Refill    albuterol sulfate  (90 Base) MCG/ACT inhaler Inhale 2 puffs Every 4 (Four) Hours As Needed for Wheezing or Shortness of Air. 8 g 1    norethindrone-ethinyl estradiol-iron (MICROGESTIN FE1.5/30) 1.5-30 MG-MCG tablet Take  by mouth daily.      sertraline (ZOLOFT) 100 MG tablet Take 1 tablet by mouth Daily. 90 tablet 1     No current facility-administered medications on file prior to visit.         HISTORY OF PRESENT ILLNESS     Liz presents for annual health maintenance visit.    General health: good  Lifestyle:  Attempting to lose weight?: Yes   Diet: eats a well balanced, healthy diet  Exercise: has not been as active recently  Tobacco: Vapes occasionally   Alcohol: occasional/infrequent  Work: Full-time, Admin with [employer]  Reproductive health:  Sexually active?: No   Sexual problems?: No problems  Concern for STD?: No "    Sees Gynecologist?: Yes   Yady/Postmenopausal?: No   Domestic abuse concerns: No   Depression Screenin/2/2024     8:12 AM   PHQ-2/PHQ-9 Depression Screening   Little Interest or Pleasure in Doing Things 0-->not at all   Feeling Down, Depressed or Hopeless 0-->not at all   PHQ-9: Brief Depression Severity Measure Score 0         PHQ-2: 0 (Not depressed)   PHQ-9: 0 (Negative screening for depression)    Patient Care Team:  Carlos Meeks MD as PCP - General (Internal Medicine)  Ai Quiroz MD as Consulting Physician (Obstetrics and Gynecology)      ALLERGIES  Allergies   Allergen Reactions    No Known Drug Allergy         PFSH:     The following portions of the patient's history were reviewed and updated as appropriate: Allergies / Current Medications / Past Medical History / Surgical History / Social History / Family History    PROBLEM LIST   Patient Active Problem List   Diagnosis    GHAZAL (generalized anxiety disorder)    Social anxiety disorder    Mild intermittent asthma    Performance anxiety    Recurrent UTI       PAST MEDICAL HISTORY  Past Medical History:   Diagnosis Date    Anxiety     Recurrent UTI     Tonsil stone        SURGICAL HISTORY  History reviewed. No pertinent surgical history.    SOCIAL HISTORY  Social History     Socioeconomic History    Marital status: Single   Tobacco Use    Smoking status: Never    Smokeless tobacco: Never   Vaping Use    Vaping status: Never Used   Substance and Sexual Activity    Alcohol use: Yes     Alcohol/week: 5.0 standard drinks of alcohol     Types: 5 Glasses of wine per week    Drug use: No     Comment: previous recreation marijuana use, not currently    Sexual activity: Not Currently     Partners: Male     Birth control/protection: Pill       FAMILY HISTORY  Family History   Problem Relation Age of Onset    Alcohol abuse Father     Osteoarthritis Father     Alcohol abuse Sister     No Known Problems Mother     No Known Problems Maternal  Grandmother     No Known Problems Sister     Depression Neg Hx     Bipolar disorder Neg Hx     Cancer Neg Hx     Diabetes Neg Hx        IMMUNIZATION HISTORY  Immunization History   Administered Date(s) Administered    COVID-19 (PFIZER) Purple Cap Monovalent 01/13/2021, 02/03/2021    Fluzone (or Fluarix & Flulaval for VFC) >6mos 10/28/2021    Fluzone Quad >6mos (Multi-dose) 11/02/2019    Hep A, 2 Dose 09/17/2007, 10/08/2008    Hep B, Adolescent or Pediatric 1993, 1993, 02/01/1994    Hepatitis B 02/15/2018    Hepatitis B Adult/Adolescent IM 01/04/2018, 02/15/2018    Influenza, Unspecified 10/25/2022    MMR 05/11/1994, 07/10/1998    PPD Test 03/01/2019    Tdap 01/20/2021    Varicella 08/13/2003, 10/05/2008         REVIEW OF SYSTEMS     Review of Systems   Constitutional:  Negative for chills, fever and unexpected weight change.   Respiratory:  Negative for cough, chest tightness and shortness of breath.    Cardiovascular:  Negative for chest pain, palpitations and leg swelling.   Neurological:  Negative for dizziness, weakness, light-headedness and headaches.   Psychiatric/Behavioral:  Negative for dysphoric mood, self-injury and suicidal ideas. The patient is nervous/anxious (Well controlled).          PHYSICAL EXAMINATION     Physical Exam  Vitals reviewed.   Constitutional:       General: She is not in acute distress.     Appearance: Normal appearance. She is not ill-appearing, toxic-appearing or diaphoretic.   HENT:      Head: Normocephalic and atraumatic.      Right Ear: Tympanic membrane, ear canal and external ear normal. There is no impacted cerumen.      Left Ear: Tympanic membrane, ear canal and external ear normal. There is no impacted cerumen.      Nose: Nose normal. No congestion or rhinorrhea.      Mouth/Throat:      Mouth: Mucous membranes are moist.      Pharynx: Oropharynx is clear. No oropharyngeal exudate or posterior oropharyngeal erythema.   Eyes:      Extraocular Movements: Extraocular  "movements intact.      Conjunctiva/sclera: Conjunctivae normal.      Pupils: Pupils are equal, round, and reactive to light.   Cardiovascular:      Rate and Rhythm: Normal rate and regular rhythm.      Heart sounds: Normal heart sounds.   Pulmonary:      Effort: Pulmonary effort is normal. No respiratory distress.      Breath sounds: Normal breath sounds.   Abdominal:      General: Bowel sounds are normal.      Palpations: Abdomen is soft.      Tenderness: There is no abdominal tenderness.   Musculoskeletal:         General: Normal range of motion.      Cervical back: Normal range of motion and neck supple.      Right lower leg: No edema.      Left lower leg: No edema.   Lymphadenopathy:      Cervical: No cervical adenopathy.   Skin:     General: Skin is warm and dry.   Neurological:      General: No focal deficit present.      Mental Status: She is alert and oriented to person, place, and time. Mental status is at baseline.   Psychiatric:         Mood and Affect: Mood normal.         Behavior: Behavior normal.         Thought Content: Thought content normal.         Judgment: Judgment normal.         REVIEWED DATA      Labs:    Lab Results   Component Value Date     06/25/2024    K 4.5 06/25/2024    CALCIUM 9.5 06/25/2024    AST 11 06/25/2024    ALT 9 06/25/2024    BUN 11 06/25/2024    CREATININE 0.85 06/25/2024    CREATININE 0.83 06/15/2022    CREATININE 0.7 08/17/2020    EGFRIFNONA 93 03/26/2020    EGFRIFAFRI 113 03/26/2020       Lab Results   Component Value Date    GLUCOSE 87 06/25/2024    HGBA1C 4.70 (L) 06/25/2024    HGBA1C 4.8 06/15/2022    HGBA1C 4.70 (L) 03/26/2020    TSH 3.160 06/25/2024    FREET4 1.11 06/25/2024       Lab Results   Component Value Date    LDL 90 06/25/2024    HDL 65 (H) 06/25/2024    TRIG 109 06/25/2024    CHOLHDLRATIO 2.68 06/25/2024       No results found for: \"[id number]\"     Lab Results   Component Value Date    WBC 5.72 06/25/2024    HGB 13.8 06/25/2024    MCV 97.2 (H) " 06/25/2024     06/25/2024       Lab Results   Component Value Date    PROTEIN Negative 06/15/2022    GLUCOSEU Negative 06/15/2022    BLOODU Trace (A) 06/15/2022    NITRITEU Negative 06/15/2022    LEUKOCYTESUR Trace (A) 06/15/2022          Lab Results   Component Value Date    HEPCVIRUSABY <0.1 06/15/2022       Imaging:        Medical Tests:        ASSESSMENT & PLAN     ANNUAL WELLNESS EXAM / PHYSICAL     Diagnoses and all orders for this visit:    1. Annual physical exam (Primary)    2. GHAZAL (generalized anxiety disorder)  Overview:  Continue sertraline 100 mg daily.           Summary/Discussion:     Continue yearly follow up with GYN office.    Next Appointment with me: Visit date not found    Return for 6 month chronic care, 1 year annual physical.      HEALTHCARE MAINTENANCE ISSUES     Cancer Screening:  Colon: Initial/Next screening at age: 43  Repeat colon cancer screening: N/A at this time  Breast: Recommended monthly self exams; annual professional exam  Mammogram: N/A at this time  Cervical: 1 year  Skin: Monthly self skin examination, annual exam by health professional  Lung: Does not meet criteria for lung cancer screening.   Other:    Screening Labs & Tests:  Lab results reviewed & discussed with the patient or test orders placed today.  EKG:  CV Screening: Lipid panel  DEXA (65+ or postmenopausal with risk factors):   HEP C (If born [birth year], or risk factors): Previously had negative screen  Other:     Immunization/Vaccinations (to be given today unless deferred by patient)  Influenza: Recommended annual influenza vaccine  Hepatitis A: Verify immunization records  Hepatitis B: Verify immunization records  Tetanus/Pertussis: Up to date  Pneumovax: Recommended here or at pharmacy  Shingles: Not needed at this time  COVID: Considering the latest booster     Lifestyle Counseling:  Lifestyle Modifications: Continue good lifestyle choices/modifications, Quit vaping , and Reduce exposure to stress if  possible  Safety Issues: Always wear seatbelt, Avoid texting while driving   Use sunscreen, regular skin examination  Recommended annual dental/vision examination.  Emotional/Stress/Sleep: Reviewed and  given when appropriate      Health Maintenance   Topic Date Due    COVID-19 Vaccine (3 - 2023-24 season) 07/04/2024 (Originally 9/1/2023)    PAP SMEAR  08/24/2024 (Originally 3/24/2023)    Pneumococcal Vaccine 0-64 (1 of 2 - PCV) 07/02/2025 (Originally 5/12/1999)    INFLUENZA VACCINE  08/01/2024    ANNUAL PHYSICAL  07/02/2025    TDAP/TD VACCINES (2 - Td or Tdap) 01/20/2031    HEPATITIS C SCREENING  Completed

## 2025-03-11 ENCOUNTER — OFFICE VISIT (OUTPATIENT)
Dept: INTERNAL MEDICINE | Age: 30
End: 2025-03-11
Payer: COMMERCIAL

## 2025-03-11 VITALS
SYSTOLIC BLOOD PRESSURE: 128 MMHG | HEIGHT: 67 IN | DIASTOLIC BLOOD PRESSURE: 84 MMHG | BODY MASS INDEX: 24.17 KG/M2 | OXYGEN SATURATION: 97 % | WEIGHT: 154 LBS | HEART RATE: 87 BPM | TEMPERATURE: 97.1 F

## 2025-03-11 DIAGNOSIS — F40.10 SOCIAL ANXIETY DISORDER: Chronic | ICD-10-CM

## 2025-03-11 DIAGNOSIS — F41.1 GAD (GENERALIZED ANXIETY DISORDER): Chronic | ICD-10-CM

## 2025-03-11 DIAGNOSIS — J45.20 MILD INTERMITTENT ASTHMA WITHOUT COMPLICATION: Chronic | ICD-10-CM

## 2025-03-11 PROCEDURE — 99214 OFFICE O/P EST MOD 30 MIN: CPT | Performed by: INTERNAL MEDICINE

## 2025-03-11 RX ORDER — TRETINOIN 0.25 MG/G
CREAM TOPICAL
COMMUNITY
Start: 2024-11-25

## 2025-03-11 RX ORDER — ALBUTEROL SULFATE 90 UG/1
2 INHALANT RESPIRATORY (INHALATION) EVERY 4 HOURS PRN
Qty: 8 G | Refills: 5 | Status: SHIPPED | OUTPATIENT
Start: 2025-03-11

## 2025-03-11 RX ORDER — SERTRALINE HYDROCHLORIDE 100 MG/1
100 TABLET, FILM COATED ORAL DAILY
Qty: 90 TABLET | Refills: 1 | Status: SHIPPED | OUTPATIENT
Start: 2025-03-11

## 2025-03-11 NOTE — PROGRESS NOTES
"        J  U  N  O  H    K  I  M ,   M  D       I  N  T  E  R  N  A  L    M  E  D  I  C  I  N  E         ENCOUNTER DATE:  03/11/2025    Liz Chapman / 29 y.o. / female    OFFICE VISIT ENCOUNTER       CHIEF COMPLAINT / REASON FOR OFFICE VISIT     GHAZAL (generalized anxiety disorder) and  Social anxiety disorder      ASSESSMENT & PLAN     Problem List Items Addressed This Visit          High    GHAZAL (generalized anxiety disorder) (Chronic)    Overview   Continue sertraline 100 mg daily.         Current Assessment & Plan   Doing well on sertraline 100 without problems.   Continue same.   Follow-up 1 year.          Relevant Medications    sertraline (ZOLOFT) 100 MG tablet       Medium    Social anxiety disorder (Chronic)    Overview   Same as per GHAZAL.          Relevant Medications    sertraline (ZOLOFT) 100 MG tablet       Low    Mild intermittent asthma (Chronic)    Overview   Well controlled.  Continue albuterol inhaler PRN.         Relevant Medications    albuterol sulfate  (90 Base) MCG/ACT inhaler     No orders of the defined types were placed in this encounter.    New Medications Ordered This Visit   Medications    sertraline (ZOLOFT) 100 MG tablet     Sig: Take 1 tablet by mouth Daily.     Dispense:  90 tablet     Refill:  1    albuterol sulfate  (90 Base) MCG/ACT inhaler     Sig: Inhale 2 puffs Every 4 (Four) Hours As Needed for Wheezing or Shortness of Air.     Dispense:  8 g     Refill:  5       SUMMARY/DISCUSSION        TOTAL TIME OF ENCOUNTER:        Next Appointment with me: Visit date not found     Return in about 1 year (around 3/11/2026) for Reassess chronic medical problems.      VITAL SIGNS     Vitals:    03/11/25 0806   BP: 128/84   Pulse: 87   Temp: 97.1 °F (36.2 °C)   SpO2: 97%   Weight: 69.9 kg (154 lb)   Height: 170.2 cm (67.01\")       BP Readings from Last 3 Encounters:   03/11/25 128/84   07/02/24 120/74   05/08/24 140/90     Wt Readings from Last 3 Encounters:   03/11/25 69.9 kg " (154 lb)   07/02/24 69.9 kg (154 lb)   05/08/24 69.9 kg (154 lb)     Body mass index is 24.11 kg/m².    Blood pressure readings recorded on patient flowsheet:       No data to display                  MEDICATIONS AT THE TIME OF OFFICE VISIT     Current Outpatient Medications on File Prior to Visit   Medication Sig    norethindrone-ethinyl estradiol-iron (MICROGESTIN FE1.5/30) 1.5-30 MG-MCG tablet Take  by mouth daily.    tretinoin (RETIN-A) 0.025 % cream APPLY PEA SIZED AMOUNT TOPICALLY TO THE AFFECTED AREA AT BEDTIME    [DISCONTINUED] albuterol sulfate  (90 Base) MCG/ACT inhaler Inhale 2 puffs Every 4 (Four) Hours As Needed for Wheezing or Shortness of Air.    [DISCONTINUED] sertraline (ZOLOFT) 100 MG tablet Take 1 tablet by mouth Daily.     No current facility-administered medications on file prior to visit.          HISTORY OF PRESENT ILLNESS     History of Present Illness  The patient is a 29-year-old female who presents for follow-up on anxiety and mild asthma.    She reports a positive response to her current medication, sertraline 100 mg daily, with no adverse effects noted.    She experiences seasonal exacerbations of her mild asthma, typically during the late fall, which she attributes to ragweed exposure. She anticipates the need for an albuterol refill in the near future. Her asthma management includes a daily allergy medication, and she estimates that she requires 1 to 2 albuterol refills annually.    She continues to use Microgestin for birth control. She maintains a regular immunization schedule, receiving her influenza vaccine annually in October. She has completed her COVID-19 vaccination series. Her Tdap is up to date. She has a scheduled annual examination on 04/03/2024, which will include a Pap smear. She received the Gardasil vaccine in her youth.      MEDICATIONS  - Sertraline  - Albuterol  - Microgestin    IMMUNIZATIONS  - Flu vaccine: Received on October 1  - Hepatitis A vaccine  - MMR  "vaccine  - Tdap vaccine  - HPV (Gardasil) vaccine       Patient Care Team:  Carlos Meeks MD as PCP - General (Internal Medicine)  Ai Quiroz MD as Consulting Physician (Obstetrics and Gynecology)    REVIEW OF SYSTEMS     Review of Systems       PHYSICAL EXAMINATION     Physical Exam  Alert with normal thought and judgment.   Normal affect and mood.   Cardiovascular: Normal rate, regular rhythm.  Pulm/Chest: Effort normal, breath sounds normal.       REVIEWED DATA     Labs:     Lab Results   Component Value Date     06/25/2024    K 4.5 06/25/2024    CALCIUM 9.5 06/25/2024    AST 11 06/25/2024    ALT 9 06/25/2024    BUN 11 06/25/2024    CREATININE 0.85 06/25/2024    CREATININE 0.83 06/15/2022    CREATININE 0.7 08/17/2020     Lab Results   Component Value Date    HGBA1C 4.70 (L) 06/25/2024    HGBA1C 4.8 06/15/2022    HGBA1C 4.70 (L) 03/26/2020   No results found for: \"MALBCRERATIO\"  Lab Results   Component Value Date    LDL 90 06/25/2024    LDL 75 06/15/2022    LDL 84 03/26/2020    HDL 65 (H) 06/25/2024    HDL 67 06/15/2022    TRIG 109 06/25/2024    TRIG 124 06/15/2022     Lab Results   Component Value Date    TSH 3.160 06/25/2024    TSH 3.580 06/15/2022    TSH 1.740 03/26/2020    FREET4 1.11 06/25/2024    FREET4 1.03 06/15/2022    FREET4 1.11 03/26/2020     Lab Results   Component Value Date    WBC 5.72 06/25/2024    HGB 13.8 06/25/2024     06/25/2024           Imaging:               Medical Tests:               Summary of old records / correspondence / consultant report:             Request outside records:       "